# Patient Record
Sex: FEMALE | Race: BLACK OR AFRICAN AMERICAN | URBAN - METROPOLITAN AREA
[De-identification: names, ages, dates, MRNs, and addresses within clinical notes are randomized per-mention and may not be internally consistent; named-entity substitution may affect disease eponyms.]

---

## 2017-03-17 ENCOUNTER — OP HISTORICAL/CONVERTED ENCOUNTER (OUTPATIENT)
Dept: OTHER | Age: 62
End: 2017-03-17

## 2018-02-16 ENCOUNTER — OP HISTORICAL/CONVERTED ENCOUNTER (OUTPATIENT)
Dept: OTHER | Age: 63
End: 2018-02-16

## 2018-05-10 ENCOUNTER — OP HISTORICAL/CONVERTED ENCOUNTER (OUTPATIENT)
Dept: OTHER | Age: 63
End: 2018-05-10

## 2018-06-12 ENCOUNTER — OP HISTORICAL/CONVERTED ENCOUNTER (OUTPATIENT)
Dept: OTHER | Age: 63
End: 2018-06-12

## 2021-11-17 ENCOUNTER — HOSPITAL ENCOUNTER (INPATIENT)
Age: 66
LOS: 4 days | Discharge: NURSING, REHAB OR LONG-TERM CARE | DRG: 393 | End: 2021-11-21
Attending: EMERGENCY MEDICINE | Admitting: INTERNAL MEDICINE
Payer: MEDICARE

## 2021-11-17 ENCOUNTER — APPOINTMENT (OUTPATIENT)
Dept: ULTRASOUND IMAGING | Age: 66
DRG: 393 | End: 2021-11-17
Attending: OBSTETRICS & GYNECOLOGY
Payer: MEDICARE

## 2021-11-17 ENCOUNTER — APPOINTMENT (OUTPATIENT)
Dept: CT IMAGING | Age: 66
DRG: 393 | End: 2021-11-17
Attending: INTERNAL MEDICINE
Payer: MEDICARE

## 2021-11-17 ENCOUNTER — APPOINTMENT (OUTPATIENT)
Dept: GENERAL RADIOLOGY | Age: 66
DRG: 393 | End: 2021-11-17
Attending: EMERGENCY MEDICINE
Payer: MEDICARE

## 2021-11-17 DIAGNOSIS — N93.9 VAGINAL BLEEDING: Primary | ICD-10-CM

## 2021-11-17 LAB
ALBUMIN SERPL-MCNC: 2.4 G/DL (ref 3.5–5)
ALBUMIN/GLOB SERPL: 0.5 {RATIO} (ref 1.1–2.2)
ALP SERPL-CCNC: 69 U/L (ref 45–117)
ALT SERPL-CCNC: 13 U/L (ref 12–78)
ANION GAP SERPL CALC-SCNC: 1 MMOL/L (ref 5–15)
AST SERPL W P-5'-P-CCNC: 27 U/L (ref 15–37)
ATRIAL RATE: 75 BPM
BASOPHILS # BLD: 0.1 K/UL (ref 0–0.1)
BASOPHILS # BLD: 0.1 K/UL (ref 0–0.1)
BASOPHILS NFR BLD: 1 % (ref 0–1)
BASOPHILS NFR BLD: 1 % (ref 0–1)
BILIRUB SERPL-MCNC: 0.4 MG/DL (ref 0.2–1)
BUN SERPL-MCNC: 16 MG/DL (ref 6–20)
BUN/CREAT SERPL: 19 (ref 12–20)
CA-I BLD-MCNC: 8.7 MG/DL (ref 8.5–10.1)
CALCULATED P AXIS, ECG09: 42 DEGREES
CALCULATED R AXIS, ECG10: 17 DEGREES
CALCULATED T AXIS, ECG11: 4 DEGREES
CHLORIDE SERPL-SCNC: 113 MMOL/L (ref 97–108)
CO2 SERPL-SCNC: 28 MMOL/L (ref 21–32)
CREAT SERPL-MCNC: 0.86 MG/DL (ref 0.55–1.02)
DIAGNOSIS, 93000: NORMAL
DIFFERENTIAL METHOD BLD: ABNORMAL
DIFFERENTIAL METHOD BLD: ABNORMAL
EOSINOPHIL # BLD: 0.2 K/UL (ref 0–0.4)
EOSINOPHIL # BLD: 0.2 K/UL (ref 0–0.4)
EOSINOPHIL NFR BLD: 2 % (ref 0–7)
EOSINOPHIL NFR BLD: 2 % (ref 0–7)
ERYTHROCYTE [DISTWIDTH] IN BLOOD BY AUTOMATED COUNT: 17.3 % (ref 11.5–14.5)
ERYTHROCYTE [DISTWIDTH] IN BLOOD BY AUTOMATED COUNT: 17.3 % (ref 11.5–14.5)
GLOBULIN SER CALC-MCNC: 4.4 G/DL (ref 2–4)
GLUCOSE SERPL-MCNC: 94 MG/DL (ref 65–100)
HCT VFR BLD AUTO: 34 % (ref 35–47)
HCT VFR BLD AUTO: 34.8 % (ref 35–47)
HCT VFR BLD AUTO: 36.8 % (ref 35–47)
HGB BLD-MCNC: 10.3 G/DL (ref 11.5–16)
HGB BLD-MCNC: 10.5 G/DL (ref 11.5–16)
HGB BLD-MCNC: 11.4 G/DL (ref 11.5–16)
HGB BLD-MCNC: 9.3 G/DL (ref 11.5–16)
IMM GRANULOCYTES # BLD AUTO: 0 K/UL (ref 0–0.04)
IMM GRANULOCYTES # BLD AUTO: 0 K/UL (ref 0–0.04)
IMM GRANULOCYTES NFR BLD AUTO: 0 % (ref 0–0.5)
IMM GRANULOCYTES NFR BLD AUTO: 0 % (ref 0–0.5)
INR PPP: 1 (ref 0.9–1.1)
LYMPHOCYTES # BLD: 2.9 K/UL (ref 0.8–3.5)
LYMPHOCYTES # BLD: 3 K/UL (ref 0.8–3.5)
LYMPHOCYTES NFR BLD: 33 % (ref 12–49)
LYMPHOCYTES NFR BLD: 33 % (ref 12–49)
MCH RBC QN AUTO: 25.5 PG (ref 26–34)
MCH RBC QN AUTO: 25.6 PG (ref 26–34)
MCHC RBC AUTO-ENTMCNC: 30.2 G/DL (ref 30–36.5)
MCHC RBC AUTO-ENTMCNC: 30.3 G/DL (ref 30–36.5)
MCV RBC AUTO: 84.4 FL (ref 80–99)
MCV RBC AUTO: 84.7 FL (ref 80–99)
MONOCYTES # BLD: 0.8 K/UL (ref 0–1)
MONOCYTES # BLD: 0.8 K/UL (ref 0–1)
MONOCYTES NFR BLD: 9 % (ref 5–13)
MONOCYTES NFR BLD: 9 % (ref 5–13)
NEUTS SEG # BLD: 4.9 K/UL (ref 1.8–8)
NEUTS SEG # BLD: 4.9 K/UL (ref 1.8–8)
NEUTS SEG NFR BLD: 55 % (ref 32–75)
NEUTS SEG NFR BLD: 55 % (ref 32–75)
NRBC # BLD: 0 K/UL (ref 0–0.01)
NRBC # BLD: 0 K/UL (ref 0–0.01)
NRBC BLD-RTO: 0 PER 100 WBC
NRBC BLD-RTO: 0 PER 100 WBC
P-R INTERVAL, ECG05: 140 MS
PLATELET # BLD AUTO: 288 K/UL (ref 150–400)
PLATELET # BLD AUTO: 318 K/UL (ref 150–400)
PMV BLD AUTO: 10.5 FL (ref 8.9–12.9)
POTASSIUM SERPL-SCNC: 4.2 MMOL/L (ref 3.5–5.1)
POTASSIUM SERPL-SCNC: 6.3 MMOL/L (ref 3.5–5.1)
PROT SERPL-MCNC: 6.8 G/DL (ref 6.4–8.2)
PROTHROMBIN TIME: 13.2 SEC (ref 11.9–14.7)
Q-T INTERVAL, ECG07: 376 MS
QRS DURATION, ECG06: 84 MS
QTC CALCULATION (BEZET), ECG08: 419 MS
RBC # BLD AUTO: 4.03 M/UL (ref 3.8–5.2)
RBC # BLD AUTO: 4.11 M/UL (ref 3.8–5.2)
SODIUM SERPL-SCNC: 142 MMOL/L (ref 136–145)
TROPONIN-HIGH SENSITIVITY: 7 NG/L (ref 0–51)
VENTRICULAR RATE, ECG03: 75 BPM
WBC # BLD AUTO: 8.8 K/UL (ref 3.6–11)
WBC # BLD AUTO: 8.9 K/UL (ref 3.6–11)

## 2021-11-17 PROCEDURE — 94761 N-INVAS EAR/PLS OXIMETRY MLT: CPT

## 2021-11-17 PROCEDURE — 85025 COMPLETE CBC W/AUTO DIFF WBC: CPT

## 2021-11-17 PROCEDURE — 99285 EMERGENCY DEPT VISIT HI MDM: CPT

## 2021-11-17 PROCEDURE — 65270000029 HC RM PRIVATE

## 2021-11-17 PROCEDURE — 85018 HEMOGLOBIN: CPT

## 2021-11-17 PROCEDURE — 76856 US EXAM PELVIC COMPLETE: CPT

## 2021-11-17 PROCEDURE — 96374 THER/PROPH/DIAG INJ IV PUSH: CPT

## 2021-11-17 PROCEDURE — 74174 CTA ABD&PLVS W/CONTRAST: CPT

## 2021-11-17 PROCEDURE — 85610 PROTHROMBIN TIME: CPT

## 2021-11-17 PROCEDURE — 93005 ELECTROCARDIOGRAM TRACING: CPT

## 2021-11-17 PROCEDURE — 86901 BLOOD TYPING SEROLOGIC RH(D): CPT

## 2021-11-17 PROCEDURE — 84484 ASSAY OF TROPONIN QUANT: CPT

## 2021-11-17 PROCEDURE — 86923 COMPATIBILITY TEST ELECTRIC: CPT

## 2021-11-17 PROCEDURE — 65610000006 HC RM INTENSIVE CARE

## 2021-11-17 PROCEDURE — 84132 ASSAY OF SERUM POTASSIUM: CPT

## 2021-11-17 PROCEDURE — 74011250636 HC RX REV CODE- 250/636: Performed by: EMERGENCY MEDICINE

## 2021-11-17 PROCEDURE — P9016 RBC LEUKOCYTES REDUCED: HCPCS

## 2021-11-17 PROCEDURE — 71045 X-RAY EXAM CHEST 1 VIEW: CPT

## 2021-11-17 PROCEDURE — 85014 HEMATOCRIT: CPT

## 2021-11-17 PROCEDURE — 74011000636 HC RX REV CODE- 636: Performed by: INTERNAL MEDICINE

## 2021-11-17 PROCEDURE — 30233N1 TRANSFUSION OF NONAUTOLOGOUS RED BLOOD CELLS INTO PERIPHERAL VEIN, PERCUTANEOUS APPROACH: ICD-10-PCS | Performed by: INTERNAL MEDICINE

## 2021-11-17 PROCEDURE — 36415 COLL VENOUS BLD VENIPUNCTURE: CPT

## 2021-11-17 RX ORDER — POLYETHYLENE GLYCOL 3350 17 G/17G
17 POWDER, FOR SOLUTION ORAL DAILY PRN
Status: DISCONTINUED | OUTPATIENT
Start: 2021-11-17 | End: 2021-11-22 | Stop reason: HOSPADM

## 2021-11-17 RX ORDER — SODIUM CHLORIDE 0.9 % (FLUSH) 0.9 %
5-40 SYRINGE (ML) INJECTION EVERY 8 HOURS
Status: DISCONTINUED | OUTPATIENT
Start: 2021-11-17 | End: 2021-11-19

## 2021-11-17 RX ORDER — ACETAMINOPHEN 650 MG/1
650 SUPPOSITORY RECTAL
Status: DISCONTINUED | OUTPATIENT
Start: 2021-11-17 | End: 2021-11-22 | Stop reason: HOSPADM

## 2021-11-17 RX ORDER — FERROUS SULFATE 220 (44)/5
220 SOLUTION, ORAL ORAL 2 TIMES DAILY
COMMUNITY

## 2021-11-17 RX ORDER — PRASUGREL 10 MG/1
10 TABLET, FILM COATED ORAL
COMMUNITY
End: 2021-11-21

## 2021-11-17 RX ORDER — HYDROCODONE BITARTRATE AND ACETAMINOPHEN 5; 325 MG/1; MG/1
1 TABLET ORAL
COMMUNITY
End: 2021-11-21

## 2021-11-17 RX ORDER — POTASSIUM CHLORIDE 750 MG/1
20 TABLET, EXTENDED RELEASE ORAL DAILY
COMMUNITY

## 2021-11-17 RX ORDER — ONDANSETRON 2 MG/ML
4 INJECTION INTRAMUSCULAR; INTRAVENOUS
Status: DISCONTINUED | OUTPATIENT
Start: 2021-11-17 | End: 2021-11-22 | Stop reason: HOSPADM

## 2021-11-17 RX ORDER — SIMETHICONE 125 MG
125 CAPSULE ORAL
COMMUNITY

## 2021-11-17 RX ORDER — ONDANSETRON 4 MG/1
4 TABLET, ORALLY DISINTEGRATING ORAL
Status: DISCONTINUED | OUTPATIENT
Start: 2021-11-17 | End: 2021-11-22 | Stop reason: HOSPADM

## 2021-11-17 RX ORDER — MEMANTINE HYDROCHLORIDE 10 MG/1
10 TABLET ORAL DAILY
COMMUNITY

## 2021-11-17 RX ORDER — ASPIRIN 81 MG/1
81 TABLET ORAL DAILY
COMMUNITY
End: 2021-11-21

## 2021-11-17 RX ORDER — ADHESIVE BANDAGE
30 BANDAGE TOPICAL DAILY PRN
COMMUNITY

## 2021-11-17 RX ORDER — SODIUM CHLORIDE 0.9 % (FLUSH) 0.9 %
5-40 SYRINGE (ML) INJECTION AS NEEDED
Status: DISCONTINUED | OUTPATIENT
Start: 2021-11-17 | End: 2021-11-22 | Stop reason: HOSPADM

## 2021-11-17 RX ORDER — DEXTROSE 50 % IN WATER (D50W) INTRAVENOUS SYRINGE
25 ONCE
Status: DISCONTINUED | OUTPATIENT
Start: 2021-11-17 | End: 2021-11-17

## 2021-11-17 RX ORDER — DIVALPROEX SODIUM 125 MG/1
125 CAPSULE, COATED PELLETS ORAL 2 TIMES DAILY
COMMUNITY

## 2021-11-17 RX ORDER — ACETAMINOPHEN 325 MG/1
650 TABLET ORAL
Status: DISCONTINUED | OUTPATIENT
Start: 2021-11-17 | End: 2021-11-22 | Stop reason: HOSPADM

## 2021-11-17 RX ORDER — DONEPEZIL HYDROCHLORIDE 10 MG/1
10 TABLET, FILM COATED ORAL
COMMUNITY

## 2021-11-17 RX ORDER — FOLIC ACID 1 MG/1
1 TABLET ORAL DAILY
COMMUNITY

## 2021-11-17 RX ORDER — TRAZODONE HYDROCHLORIDE 50 MG/1
50 TABLET ORAL
COMMUNITY

## 2021-11-17 RX ORDER — AMLODIPINE BESYLATE 2.5 MG/1
2.5 TABLET ORAL DAILY
COMMUNITY
End: 2021-11-21

## 2021-11-17 RX ORDER — GLYCOPYRROLATE 1 MG/1
1 TABLET ORAL 2 TIMES DAILY
COMMUNITY

## 2021-11-17 RX ORDER — GABAPENTIN 100 MG/1
100 CAPSULE ORAL 3 TIMES DAILY
COMMUNITY

## 2021-11-17 RX ORDER — SIMVASTATIN 20 MG/1
20 TABLET, FILM COATED ORAL
COMMUNITY

## 2021-11-17 RX ORDER — FUROSEMIDE 10 MG/ML
20 INJECTION INTRAMUSCULAR; INTRAVENOUS
Status: COMPLETED | OUTPATIENT
Start: 2021-11-17 | End: 2021-11-17

## 2021-11-17 RX ORDER — SODIUM CHLORIDE 9 MG/ML
150 INJECTION, SOLUTION INTRAVENOUS CONTINUOUS
Status: DISCONTINUED | OUTPATIENT
Start: 2021-11-17 | End: 2021-11-18

## 2021-11-17 RX ADMIN — SODIUM CHLORIDE 1000 ML: 9 INJECTION, SOLUTION INTRAVENOUS at 16:08

## 2021-11-17 RX ADMIN — IOPAMIDOL 100 ML: 755 INJECTION, SOLUTION INTRAVENOUS at 23:06

## 2021-11-17 RX ADMIN — FUROSEMIDE 20 MG: 10 INJECTION, SOLUTION INTRAMUSCULAR; INTRAVENOUS at 16:07

## 2021-11-17 RX ADMIN — Medication 10 ML: at 16:08

## 2021-11-17 NOTE — H&P
GENERAL GENERIC H&P/CONSULT    Subjective:    66F with a past medical history significant for CVA w/R-sided deficits and aphasia, on anticoagulation with prasugrel. Nonverbal and resides in a nursing home. 11/17/21 presents to hospital from [address] [place] [address] with what was initially thought to be rectal bleeding. During the ED course, determined to be vaginal bleeding. Stable hemoglobin during the ED course and plan to admit to hospital for stabilization. Later became more hypotensive, managed with fluid boluses, and more profusely bleeding and status changed to ICU. Past Medical History:   Diagnosis Date    Aphasia as late effect of cerebrovascular accident     Cerebral aneurysm     Coronary artery disease     [facility]     Dementia ([facility]     Epilepsy ([facility] [facility]     GERD (gastroesophageal reflux disease)     Hyperlipidemia     Hypertension     Ischemic optic neuropathy of left eye     Right hemiparesis ([facility] [address] [facility]       History reviewed. No pertinent surgical history. Prior to Admission medications    Medication Sig Start Date End Date Taking? Authorizing Provider   amLODIPine (NORVASC) 2.5 mg tablet Take 2.5 mg by mouth daily. Yes Provider, Historical   aspirin delayed-release 81 mg tablet Take 81 mg by mouth daily. Yes Provider, Historical   divalproex (Depakote Sprinkles) 125 mg capsule Take 125 mg by mouth two (2) times a day. Yes Provider, Historical   donepeziL (ARICEPT) 10 mg tablet Take 10 mg by mouth nightly. Yes Provider, Historical   prasugreL (Effient) 10 mg tablet Take 10 mg by mouth. Yes Provider, Historical   ferrous sulfate 220 mg (44 mg iron)/5 mL solution Take 220 mg by mouth two (2) times a day. Yes Provider, Historical   folic acid (FOLVITE) 1 mg tablet Take 1 mg by mouth daily. Yes Provider, Historical   gabapentin (NEURONTIN) 100 mg capsule Take 100 mg by mouth three (3) times daily.    Yes Provider, Historical   glycopyrrolate (ROBINUL) 1 mg tablet Take 1 mg by mouth two (2) times a day. Yes Provider, Historical   memantine (NAMENDA) 10 mg tablet Take 10 mg by mouth daily. Yes Provider, Historical   magnesium hydroxide (Peña Milk of Magnesia) 400 mg/5 mL suspension Take 30 mL by mouth daily as needed for Constipation. Yes Provider, Historical   HYDROcodone-acetaminophen (Norco) 5-325 mg per tablet Take 1 Tablet by mouth every six (6) hours as needed for Pain. Yes Provider, Historical   benzocaine (ORAJEL) 20 % gel topical gel Apply  to affected area as needed for Pain. Yes Provider, Historical   potassium chloride (KLOR-CON M10) 10 mEq tablet Take 20 mEq by mouth daily. Yes Provider, Historical   simethicone (GAS-X) 125 mg capsule Take 125 mg by mouth four (4) times daily as needed for Flatulence. Yes Provider, Historical   traZODone (DESYREL) 50 mg tablet Take 50 mg by mouth nightly. Yes Provider, Historical   simvastatin (Zocor) 20 mg tablet Take 20 mg by mouth nightly. Yes Provider, Historical     No Known Allergies   Social History     Tobacco Use    Smoking status: Not on file    Smokeless tobacco: Not on file   Substance Use Topics    Alcohol use: Not on file      Family History   Family history unknown: Yes      Review of Systems   Unable to perform ROS: Acuity of condition       Objective:    No intake/output data recorded. No intake/output data recorded.   Patient Vitals for the past 8 hrs:   BP Temp Pulse Resp SpO2 Height Weight   11/17/21 1805 (!) 57/41  97 (!) 34      11/17/21 1752 92/78  (!) 111 23 97 %     11/17/21 1723     98 %     11/17/21 1721 (!) 80/58  (!) 112  99 %     11/17/21 1607 104/80  89  98 %     11/17/21 1528 133/75  96 22 98 %     11/17/21 1505 (!) 85/66  85 20 98 %     11/17/21 1434 109/74 98.4 °F (36.9 °C) 81 20 97 % 5' 6\" (1.676 m) 81.6 kg (180 lb)     Physical Exam   Constitutional: Awake and alert, interactive, NAD  Eyes: PERRL, no injection or scleral icterus, no discharge  HEENT: NCAT, neck supple, MMM, no oropharyngeal exudates  CV: RRR, no m/r/g  Respiratory: CTAB, no r/r/w  GI: Abd soft, nondistended, nontender  : No bleeding from rectum, gross blood from vagina  MSK: FROM, no joint effusions or edema  Skin: No rashes  Neuro: Symmetric facies, aphasic, R-sided hemiplegia    Labs:    Recent Results (from the past 24 hour(s))   CBC WITH AUTOMATED DIFF    Collection Time: 11/17/21  2:53 PM   Result Value Ref Range    WBC 8.8 3.6 - 11.0 K/uL    RBC 4.11 3.80 - 5.20 M/uL    HGB 10.5 (L) 11.5 - 16.0 g/dL    HCT 34.8 (L) 35.0 - 47.0 %    MCV 84.7 80.0 - 99.0 FL    MCH 25.5 (L) 26.0 - 34.0 PG    MCHC 30.2 30.0 - 36.5 g/dL    RDW 17.3 (H) 11.5 - 14.5 %    PLATELET 596 737 - 431 K/uL    MPV 10.5 8.9 - 12.9 FL    NRBC 0.0 0.0  WBC    ABSOLUTE NRBC 0.00 0.00 - 0.01 K/uL    NEUTROPHILS 55 32 - 75 %    LYMPHOCYTES 33 12 - 49 %    MONOCYTES 9 5 - 13 %    EOSINOPHILS 2 0 - 7 %    BASOPHILS 1 0 - 1 %    IMMATURE GRANULOCYTES 0 0 - 0.5 %    ABS. NEUTROPHILS 4.9 1.8 - 8.0 K/UL    ABS. LYMPHOCYTES 2.9 0.8 - 3.5 K/UL    ABS. MONOCYTES 0.8 0.0 - 1.0 K/UL    ABS. EOSINOPHILS 0.2 0.0 - 0.4 K/UL    ABS. BASOPHILS 0.1 0.0 - 0.1 K/UL    ABS. IMM. GRANS. 0.0 0.00 - 0.04 K/UL    DF AUTOMATED     METABOLIC PANEL, COMPREHENSIVE    Collection Time: 11/17/21  2:53 PM   Result Value Ref Range    Sodium 142 136 - 145 mmol/L    Potassium 6.3 (H) 3.5 - 5.1 mmol/L    Chloride 113 (H) 97 - 108 mmol/L    CO2 28 21 - 32 mmol/L    Anion gap 1 (L) 5 - 15 mmol/L    Glucose 94 65 - 100 mg/dL    BUN 16 6 - 20 mg/dL    Creatinine 0.86 0.55 - 1.02 mg/dL    BUN/Creatinine ratio 19 12 - 20      GFR est AA >60 >60 ml/min/1.73m2    GFR est non-AA >60 >60 ml/min/1.73m2    Calcium 8.7 8.5 - 10.1 mg/dL    Bilirubin, total 0.4 0.2 - 1.0 mg/dL    AST (SGOT) 27 15 - 37 U/L    ALT (SGPT) 13 12 - 78 U/L    Alk.  phosphatase 69 45 - 117 U/L    Protein, total 6.8 6.4 - 8.2 g/dL    Albumin 2.4 (L) 3.5 - 5.0 g/dL    Globulin 4.4 (H) 2.0 - 4.0 g/dL    A-G Ratio 0.5 (L) 1.1 - 2.2     TYPE & SCREEN    Collection Time: 11/17/21  2:53 PM   Result Value Ref Range    Crossmatch Expiration 11/20/2021,2359     ABO/Rh(D) Gwyn Corado Positive     Antibody screen Negative    TROPONIN-HIGH SENSITIVITY    Collection Time: 11/17/21  2:53 PM   Result Value Ref Range    Troponin-High Sensitivity 7 0 - 51 ng/L   PROTHROMBIN TIME + INR    Collection Time: 11/17/21  2:53 PM   Result Value Ref Range    Prothrombin time 13.2 11.9 - 14.7 sec    INR 1.0 0.9 - 1.1     CBC WITH AUTOMATED DIFF    Collection Time: 11/17/21  4:37 PM   Result Value Ref Range    WBC 8.9 3.6 - 11.0 K/uL    RBC 4.03 3.80 - 5.20 M/uL    HGB 10.3 (L) 11.5 - 16.0 g/dL    HCT 34.0 (L) 35.0 - 47.0 %    MCV 84.4 80.0 - 99.0 FL    MCH 25.6 (L) 26.0 - 34.0 PG    MCHC 30.3 30.0 - 36.5 g/dL    RDW 17.3 (H) 11.5 - 14.5 %    PLATELET 477 224 - 608 K/uL    NRBC 0.0 0.0  WBC    ABSOLUTE NRBC 0.00 0.00 - 0.01 K/uL    NEUTROPHILS 55 32 - 75 %    LYMPHOCYTES 33 12 - 49 %    MONOCYTES 9 5 - 13 %    EOSINOPHILS 2 0 - 7 %    BASOPHILS 1 0 - 1 %    IMMATURE GRANULOCYTES 0 0 - 0.5 %    ABS. NEUTROPHILS 4.9 1.8 - 8.0 K/UL    ABS. LYMPHOCYTES 3.0 0.8 - 3.5 K/UL    ABS. MONOCYTES 0.8 0.0 - 1.0 K/UL    ABS. EOSINOPHILS 0.2 0.0 - 0.4 K/UL    ABS. BASOPHILS 0.1 0.0 - 0.1 K/UL    ABS. IMM. GRANS. 0.0 0.00 - 0.04 K/UL    DF AUTOMATED     POTASSIUM    Collection Time: 11/17/21  4:37 PM   Result Value Ref Range    Potassium 4.2 3.5 - 5.1 mmol/L     Chest X-Ray: Chest view.     Comparison single view chest November 26, 2015.     Lungs clear; no interstitial or alveolar pulmonary edema. Cardiac and  mediastinal structures magnified on this AP view. Similar degree thoracic aorta  ectasia. No pneumothorax or sizable pleural effusion    Assessment:  Active Problems:    Vaginal bleeding (11/17/2021)    66F with a past medical history significant for CVA w/R-sided deficits and aphasia, on anticoagulation with prasugrel. Nonverbal and resides in a nursing home. 11/17/21 presents to hospital from [address] [place] [address] with what was initially thought to be rectal bleeding. During the ED course, determined to be vaginal bleeding. Stable hemoglobin during the ED course and plan to admit to hospital for stabilization. Later became more hypotensive, managed with fluid boluses, and more profusely bleeding and status changed to ICU. Plan:    1) Profuse vaginal bleeding in a patient on anticoagulation after stroke. Normal platelets, normal INR.      Admit to hospital and ICU   Supportive care   Intravenous fluid boluses   Hold all anticoagulants   PRBC if needed   Awaiting gynecology for stabilization of bleed    Signed:  Raf Hinson MD 11/17/2021

## 2021-11-17 NOTE — ED PROVIDER NOTES
EMERGENCY DEPARTMENT HISTORY AND PHYSICAL EXAM      Date: 11/17/2021  Patient Name: Rahel Pinon      History of Presenting Illness     Chief Complaint   Patient presents with    Anal Bleeding       History Provided By: Patient and EMS    HPI: Rahel Pinon, 74 y.o. female with a past medical history significant for CVA w/R-sided deficits and aphasia, Dementia presents to the ED with cc of \"anal bleeding. \" Pt unable to speak 2/2 stroke. Denies current sx of CP, SOB. Does endorse bleeding since yesterday and lightheadedness. States she has had vaginal bleeding before but cannot qualify further, unsure if she has had to go to hospital for same. There are no other complaints, changes, or physical findings at this time. PCP: No primary care provider on file. Past History     Past Medical History:  No past medical history on file. Past Surgical History:  No past surgical history on file. Family History:  No family history on file. Social History:  Social History     Tobacco Use    Smoking status: Not on file    Smokeless tobacco: Not on file   Substance Use Topics    Alcohol use: Not on file    Drug use: Not on file       Allergies:  No Known Allergies      Review of Systems   Constitutional: Negative except as in HPI. Eyes: Negative except as in HPI.  ENT: Negative except as in HPI. Cardiovascular: Negative except as in HPI. Respiratory: Negative except as in HPI. Gastrointestinal: Negative except as in HPI. Genitourinary: Negative except as in HPI. Musculoskeletal: Negative except as in HPI. Integumentary: Negative except as in HPI. Neurological: Negative except as in HPI. Psychiatric: Negative except as in HPI. Endocrine: Negative except as in HPI. Hematologic/Lymphatic: Negative except as in HPI. Allergic/Immunologic: Negative except as in HPI.     Physical Exam   Constitutional: Awake and alert, interactive, NAD  Eyes: PERRL, no injection or scleral icterus, no discharge  HEENT: NCAT, neck supple, MMM, no oropharyngeal exudates  CV: RRR, no m/r/g  Respiratory: CTAB, no r/r/w  GI: Abd soft, nondistended, nontender  : No bleeding from rectum, gross blood from vagina  MSK: FROM, no joint effusions or edema  Skin: No rashes  Neuro: Symmetric facies, aphasic, R-sided hemiplegia    Lab and Diagnostic Study Results     Labs -   No results found for this or any previous visit (from the past 12 hour(s)). Radiologic Studies -   [unfilled]  CT Results  (Last 48 hours)      None          CXR Results  (Last 48 hours)      None            Medical Decision Making and ED Course   - I am the first and primary provider for this patient AND AM THE PRIMARY PROVIDER OF RECORD. - I reviewed the vital signs, available nursing notes, past medical history, past surgical history, family history and social history. - Initial assessment performed. The patients presenting problems have been discussed, and the staff are in agreement with the care plan formulated and outlined with them. I have encouraged them to ask questions as they arise throughout their visit. Vital Signs-Reviewed the patient's vital signs. Patient Vitals for the past 12 hrs:   Temp Pulse Resp BP SpO2   11/17/21 1434 98.4 °F (36.9 °C) 81 20 109/74 97 %       EKG interpretation:         Provider Notes (Medical Decision Making):     25H w/vaginal bleeding. Abnormal uterine bleeding suspected, will get CBC, T&S and transfuse >7, consult GYN for recs, likely admit for treatment. ED Course:       ED Course as of 11/17/21 2122 Wed Nov 17, 2021   1549 Potassium(!): 6.3  No EKG changes. Giving IVF, lasix, Insulin/D50 [YA]   1612 Spoke to OBGYN on call Dr. Jules Whitfield, states no medication he would recommend at this time or intervention by OBGYN. Would recommend admission to medicine for observation. Will repeat CBC and K+ and admit. Spoke to Dr. Juancarlos Vann, hospitalist on call.  [YA]   1704 Potassium: 4.2  Improved, likely 2/2 hemolysis. Only received lasix and IVF. Hgb stable on recheck, but will admit for obs per GYN. [YA]   [address] Admitted to Dr. Hannah Aleman [YA]      ED Course User Index  [YA] Lisa Fagan MD         Consultations:     OBGYN Dr. Zayda Bagley      Disposition     Disposition: Admitted to Floor Medical Floor the case was discussed with the admitting physician Dr. Serena Bagley    Admitted      Diagnosis     Clinical Impression:   1. Vaginal bleeding        Attestations:     Lester Bacon MD

## 2021-11-17 NOTE — ED TRIAGE NOTES
Per ems called for rectal bleeding, pt does not speak due to previous cva but a&ox4. Per pt rectal bleeding started yesterday, denies any pain.

## 2021-11-17 NOTE — Clinical Note
Status[de-identified] INPATIENT [101]   Type of Bed: Remote Telemetry [29]   Cardiac Monitoring Required?: Yes   Inpatient Hospitalization Certified Necessary for the Following Reasons: 3.  Patient receiving treatment that can only be provided in an inpatient setting (further clarification in H&P documentation)   Admitting Diagnosis: Vaginal bleeding [[id number]]   Admitting Physician: Romero Menjivar [[id number]]   Attending Physician: Romero Menjivar [[id number]]   Estimated Length of Stay: 3-4 Midnights   Discharge Plan[de-identified] Extended Care Facility (e.g. Adult Home, Nursing Home, etc.)

## 2021-11-17 NOTE — PROGRESS NOTES
11/17/21. PCP is MELA Bardales - seen q 30 days @ [facility]. Message left for guardian Grazyna Metzger)  @ [phone] ( after hours #) via [place] regarding Choice Letter - message . Pt from [facility] .  Alert but does not speak,

## 2021-11-17 NOTE — PROGRESS NOTES
Reason for Admission:  Vaginal Bleeding                     RUR Score:   N/A                  Plan for utilizing home health:None. From [facility]. PCP: First and Last name:  Jenny Desai     Name of Practice:    Are you a current patient: Yes/No: Yes   Approximate date of last visit:Seen q 30 days @ SNF. Can you participate in a virtual visit with your PCP: No. Pt does not speak. Current Advanced Directive/Advance Care Plan: Full Code      Healthcare Decision Maker:   Nicolle Winston @ 048-034. After hours call: [phone]- [place] [facility]. Transition of Care Plan:  Awaiting contact for guardian for Choice Letter to refer pt back to [facility]  upon discharge. Pt will need transportation.

## 2021-11-18 ENCOUNTER — ANESTHESIA (OUTPATIENT)
Dept: ENDOSCOPY | Age: 66
DRG: 393 | End: 2021-11-18
Payer: MEDICARE

## 2021-11-18 ENCOUNTER — APPOINTMENT (OUTPATIENT)
Dept: ENDOSCOPY | Age: 66
DRG: 393 | End: 2021-11-18
Attending: INTERNAL MEDICINE
Payer: MEDICARE

## 2021-11-18 ENCOUNTER — ANESTHESIA EVENT (OUTPATIENT)
Dept: ENDOSCOPY | Age: 66
DRG: 393 | End: 2021-11-18
Payer: MEDICARE

## 2021-11-18 LAB
ABO + RH BLD: NORMAL
ALBUMIN SERPL-MCNC: 2.3 G/DL (ref 3.5–5)
ALBUMIN/GLOB SERPL: 0.7 {RATIO} (ref 1.1–2.2)
ALP SERPL-CCNC: 59 U/L (ref 45–117)
ALT SERPL-CCNC: 7 U/L (ref 12–78)
ANION GAP SERPL CALC-SCNC: 4 MMOL/L (ref 5–15)
APTT PPP: 29.2 SEC (ref 21.2–34.1)
AST SERPL W P-5'-P-CCNC: 11 U/L (ref 15–37)
BASOPHILS # BLD: 0 K/UL (ref 0–0.1)
BASOPHILS # BLD: 0.1 K/UL (ref 0–0.1)
BASOPHILS # BLD: 0.1 K/UL (ref 0–0.1)
BASOPHILS NFR BLD: 0 % (ref 0–1)
BILIRUB SERPL-MCNC: 0.2 MG/DL (ref 0.2–1)
BLOOD GROUP ANTIBODIES SERPL: NEGATIVE
BUN SERPL-MCNC: 11 MG/DL (ref 6–20)
BUN/CREAT SERPL: 18 (ref 12–20)
CA-I BLD-MCNC: 7.2 MG/DL (ref 8.5–10.1)
CHLORIDE SERPL-SCNC: 115 MMOL/L (ref 97–108)
CO2 SERPL-SCNC: 26 MMOL/L (ref 21–32)
CREAT SERPL-MCNC: 0.62 MG/DL (ref 0.55–1.02)
DIFFERENTIAL METHOD BLD: ABNORMAL
EOSINOPHIL # BLD: 0.1 K/UL (ref 0–0.4)
EOSINOPHIL NFR BLD: 1 % (ref 0–7)
ERYTHROCYTE [DISTWIDTH] IN BLOOD BY AUTOMATED COUNT: 17 % (ref 11.5–14.5)
GLOBULIN SER CALC-MCNC: 3.4 G/DL (ref 2–4)
GLUCOSE BLD STRIP.AUTO-MCNC: 94 MG/DL (ref 65–117)
GLUCOSE BLD STRIP.AUTO-MCNC: 97 MG/DL (ref 65–117)
GLUCOSE SERPL-MCNC: 115 MG/DL (ref 65–100)
HCT VFR BLD AUTO: 25.3 % (ref 35–47)
HCT VFR BLD AUTO: 30.2 % (ref 35–47)
HCT VFR BLD AUTO: 35.2 % (ref 35–47)
HGB BLD-MCNC: 10.9 G/DL (ref 11.5–16)
HGB BLD-MCNC: 7.9 G/DL (ref 11.5–16)
HGB BLD-MCNC: 9.5 G/DL (ref 11.5–16)
IMM GRANULOCYTES # BLD AUTO: 0 K/UL (ref 0–0.04)
IMM GRANULOCYTES # BLD AUTO: 0 K/UL (ref 0–0.04)
IMM GRANULOCYTES # BLD AUTO: 0.1 K/UL (ref 0–0.04)
IMM GRANULOCYTES NFR BLD AUTO: 0 % (ref 0–0.5)
INR PPP: 1.1 (ref 0.9–1.1)
LACTATE SERPL-SCNC: 1.2 MMOL/L (ref 0.4–2)
LYMPHOCYTES # BLD: 1.9 K/UL (ref 0.8–3.5)
LYMPHOCYTES # BLD: 2.9 K/UL (ref 0.8–3.5)
LYMPHOCYTES # BLD: 3.4 K/UL (ref 0.8–3.5)
LYMPHOCYTES NFR BLD: 20 % (ref 12–49)
LYMPHOCYTES NFR BLD: 21 % (ref 12–49)
LYMPHOCYTES NFR BLD: 25 % (ref 12–49)
MAGNESIUM SERPL-MCNC: 1.8 MG/DL (ref 1.6–2.4)
MCH RBC QN AUTO: 26.5 PG (ref 26–34)
MCH RBC QN AUTO: 27.1 PG (ref 26–34)
MCH RBC QN AUTO: 27.1 PG (ref 26–34)
MCHC RBC AUTO-ENTMCNC: 31 G/DL (ref 30–36.5)
MCHC RBC AUTO-ENTMCNC: 31.2 G/DL (ref 30–36.5)
MCHC RBC AUTO-ENTMCNC: 31.5 G/DL (ref 30–36.5)
MCV RBC AUTO: 85.6 FL (ref 80–99)
MCV RBC AUTO: 86 FL (ref 80–99)
MCV RBC AUTO: 86.6 FL (ref 80–99)
MONOCYTES # BLD: 0.8 K/UL (ref 0–1)
MONOCYTES # BLD: 1 K/UL (ref 0–1)
MONOCYTES # BLD: 1.4 K/UL (ref 0–1)
MONOCYTES NFR BLD: 8 % (ref 5–13)
MONOCYTES NFR BLD: 8 % (ref 5–13)
MONOCYTES NFR BLD: 9 % (ref 5–13)
MRSA DNA SPEC QL NAA+PROBE: NOT DETECTED
NEUTS SEG # BLD: 11.9 K/UL (ref 1.8–8)
NEUTS SEG # BLD: 6.3 K/UL (ref 1.8–8)
NEUTS SEG # BLD: 7.5 K/UL (ref 1.8–8)
NEUTS SEG NFR BLD: 65 % (ref 32–75)
NEUTS SEG NFR BLD: 70 % (ref 32–75)
NEUTS SEG NFR BLD: 71 % (ref 32–75)
NRBC # BLD: 0 K/UL (ref 0–0.01)
NRBC BLD-RTO: 0 PER 100 WBC
PERFORMED BY, TECHID: NORMAL
PERFORMED BY, TECHID: NORMAL
PLATELET # BLD AUTO: 195 K/UL (ref 150–400)
PLATELET # BLD AUTO: 213 K/UL (ref 150–400)
PLATELET # BLD AUTO: 230 K/UL (ref 150–400)
PMV BLD AUTO: 10.5 FL (ref 8.9–12.9)
PMV BLD AUTO: 10.7 FL (ref 8.9–12.9)
POTASSIUM SERPL-SCNC: 3.7 MMOL/L (ref 3.5–5.1)
POTASSIUM SERPL-SCNC: 3.9 MMOL/L (ref 3.5–5.1)
PROT SERPL-MCNC: 5.7 G/DL (ref 6.4–8.2)
PROTHROMBIN TIME: 13.6 SEC (ref 11.9–14.7)
RBC # BLD AUTO: 2.92 M/UL (ref 3.8–5.2)
RBC # BLD AUTO: 3.51 M/UL (ref 3.8–5.2)
RBC # BLD AUTO: 4.11 M/UL (ref 3.8–5.2)
SODIUM SERPL-SCNC: 145 MMOL/L (ref 136–145)
SPECIMEN EXP DATE BLD: NORMAL
THERAPEUTIC RANGE,PTTT: NORMAL SEC (ref 82–109)
WBC # BLD AUTO: 11.6 K/UL (ref 3.6–11)
WBC # BLD AUTO: 16.9 K/UL (ref 3.6–11)
WBC # BLD AUTO: 9.2 K/UL (ref 3.6–11)

## 2021-11-18 PROCEDURE — P9016 RBC LEUKOCYTES REDUCED: HCPCS

## 2021-11-18 PROCEDURE — 85610 PROTHROMBIN TIME: CPT

## 2021-11-18 PROCEDURE — 74011250636 HC RX REV CODE- 250/636: Performed by: INTERNAL MEDICINE

## 2021-11-18 PROCEDURE — 77030041709 HC NDL SCLER BSC -C: Performed by: INTERNAL MEDICINE

## 2021-11-18 PROCEDURE — P9047 ALBUMIN (HUMAN), 25%, 50ML: HCPCS | Performed by: INTERNAL MEDICINE

## 2021-11-18 PROCEDURE — 74011250636 HC RX REV CODE- 250/636: Performed by: NURSE ANESTHETIST, CERTIFIED REGISTERED

## 2021-11-18 PROCEDURE — 85025 COMPLETE CBC W/AUTO DIFF WBC: CPT

## 2021-11-18 PROCEDURE — 36415 COLL VENOUS BLD VENIPUNCTURE: CPT

## 2021-11-18 PROCEDURE — 82962 GLUCOSE BLOOD TEST: CPT

## 2021-11-18 PROCEDURE — 76060000035 HC ANESTHESIA 2 TO 2.5 HR: Performed by: INTERNAL MEDICINE

## 2021-11-18 PROCEDURE — 0W3P8ZZ CONTROL BLEEDING IN GASTROINTESTINAL TRACT, VIA NATURAL OR ARTIFICIAL OPENING ENDOSCOPIC: ICD-10-PCS | Performed by: INTERNAL MEDICINE

## 2021-11-18 PROCEDURE — 77030010936 HC CLP LIG BSC -C: Performed by: INTERNAL MEDICINE

## 2021-11-18 PROCEDURE — 76040000003: Performed by: INTERNAL MEDICINE

## 2021-11-18 PROCEDURE — 83735 ASSAY OF MAGNESIUM: CPT

## 2021-11-18 PROCEDURE — 36430 TRANSFUSION BLD/BLD COMPNT: CPT

## 2021-11-18 PROCEDURE — 87641 MR-STAPH DNA AMP PROBE: CPT

## 2021-11-18 PROCEDURE — 74011000250 HC RX REV CODE- 250: Performed by: INTERNAL MEDICINE

## 2021-11-18 PROCEDURE — 74011000258 HC RX REV CODE- 258: Performed by: INTERNAL MEDICINE

## 2021-11-18 PROCEDURE — 74011000250 HC RX REV CODE- 250: Performed by: NURSE ANESTHETIST, CERTIFIED REGISTERED

## 2021-11-18 PROCEDURE — 84132 ASSAY OF SERUM POTASSIUM: CPT

## 2021-11-18 PROCEDURE — 85730 THROMBOPLASTIN TIME PARTIAL: CPT

## 2021-11-18 PROCEDURE — 65610000006 HC RM INTENSIVE CARE

## 2021-11-18 PROCEDURE — 77030009426 HC FCPS BIOP ENDOSC BSC -B: Performed by: INTERNAL MEDICINE

## 2021-11-18 PROCEDURE — 83605 ASSAY OF LACTIC ACID: CPT

## 2021-11-18 RX ORDER — PROPOFOL 10 MG/ML
INJECTION, EMULSION INTRAVENOUS AS NEEDED
Status: DISCONTINUED | OUTPATIENT
Start: 2021-11-18 | End: 2021-11-18 | Stop reason: HOSPADM

## 2021-11-18 RX ORDER — NOREPINEPHRINE BITARTRATE/D5W 8 MG/250ML
.5-16 PLASTIC BAG, INJECTION (ML) INTRAVENOUS
Status: DISCONTINUED | OUTPATIENT
Start: 2021-11-18 | End: 2021-11-20

## 2021-11-18 RX ORDER — LIDOCAINE HYDROCHLORIDE 20 MG/ML
INJECTION, SOLUTION EPIDURAL; INFILTRATION; INTRACAUDAL; PERINEURAL AS NEEDED
Status: DISCONTINUED | OUTPATIENT
Start: 2021-11-18 | End: 2021-11-18 | Stop reason: HOSPADM

## 2021-11-18 RX ORDER — FOLIC ACID 1 MG/1
1 TABLET ORAL DAILY
Status: DISCONTINUED | OUTPATIENT
Start: 2021-11-18 | End: 2021-11-22 | Stop reason: HOSPADM

## 2021-11-18 RX ORDER — ALBUMIN HUMAN 250 G/1000ML
25 SOLUTION INTRAVENOUS ONCE
Status: COMPLETED | OUTPATIENT
Start: 2021-11-18 | End: 2021-11-18

## 2021-11-18 RX ORDER — SODIUM CHLORIDE 450 MG/100ML
75 INJECTION, SOLUTION INTRAVENOUS CONTINUOUS
Status: DISCONTINUED | OUTPATIENT
Start: 2021-11-18 | End: 2021-11-22 | Stop reason: HOSPADM

## 2021-11-18 RX ORDER — SIMVASTATIN 10 MG/1
20 TABLET, FILM COATED ORAL
Status: DISCONTINUED | OUTPATIENT
Start: 2021-11-18 | End: 2021-11-22 | Stop reason: HOSPADM

## 2021-11-18 RX ORDER — DONEPEZIL HYDROCHLORIDE 5 MG/1
10 TABLET, FILM COATED ORAL
Status: DISCONTINUED | OUTPATIENT
Start: 2021-11-18 | End: 2021-11-22 | Stop reason: HOSPADM

## 2021-11-18 RX ORDER — NOREPINEPHRINE BITARTRATE 1 MG/ML
INJECTION, SOLUTION INTRAVENOUS
Status: DISPENSED
Start: 2021-11-18 | End: 2021-11-19

## 2021-11-18 RX ORDER — SODIUM CHLORIDE 9 MG/ML
250 INJECTION, SOLUTION INTRAVENOUS AS NEEDED
Status: DISCONTINUED | OUTPATIENT
Start: 2021-11-18 | End: 2021-11-22 | Stop reason: HOSPADM

## 2021-11-18 RX ORDER — PHENYLEPHRINE HYDROCHLORIDE 10 MG/ML
INJECTION INTRAVENOUS
Status: DISPENSED
Start: 2021-11-18 | End: 2021-11-19

## 2021-11-18 RX ORDER — MEMANTINE HYDROCHLORIDE 10 MG/1
10 TABLET ORAL DAILY
Status: DISCONTINUED | OUTPATIENT
Start: 2021-11-18 | End: 2021-11-22 | Stop reason: HOSPADM

## 2021-11-18 RX ORDER — TRAZODONE HYDROCHLORIDE 50 MG/1
50 TABLET ORAL
Status: DISCONTINUED | OUTPATIENT
Start: 2021-11-18 | End: 2021-11-22 | Stop reason: HOSPADM

## 2021-11-18 RX ORDER — DIVALPROEX SODIUM 125 MG/1
125 CAPSULE, COATED PELLETS ORAL 2 TIMES DAILY
Status: DISCONTINUED | OUTPATIENT
Start: 2021-11-18 | End: 2021-11-20

## 2021-11-18 RX ADMIN — SODIUM CHLORIDE 60 MG: 9 INJECTION, SOLUTION INTRAVENOUS at 15:10

## 2021-11-18 RX ADMIN — SODIUM CHLORIDE 75 ML/HR: 4.5 INJECTION, SOLUTION INTRAVENOUS at 09:36

## 2021-11-18 RX ADMIN — PROPOFOL 60 MG: 10 INJECTION, EMULSION INTRAVENOUS at 13:26

## 2021-11-18 RX ADMIN — ALBUMIN (HUMAN) 25 G: 0.25 INJECTION, SOLUTION INTRAVENOUS at 15:13

## 2021-11-18 RX ADMIN — Medication 10 ML: at 14:00

## 2021-11-18 RX ADMIN — Medication 10 ML: at 06:00

## 2021-11-18 RX ADMIN — SODIUM CHLORIDE 150 ML/HR: 9 INJECTION, SOLUTION INTRAVENOUS at 01:39

## 2021-11-18 RX ADMIN — SODIUM CHLORIDE 500 ML: 9 INJECTION, SOLUTION INTRAVENOUS at 16:00

## 2021-11-18 RX ADMIN — Medication 20 ML: at 14:00

## 2021-11-18 RX ADMIN — LIDOCAINE HYDROCHLORIDE 100 MG: 20 INJECTION, SOLUTION EPIDURAL; INFILTRATION; INTRACAUDAL; PERINEURAL at 13:26

## 2021-11-18 NOTE — CONSULTS
Consult    Patient: Pat Jacome MRN: [id number]  SSN: [id number]    YOB: 1955  Age: 74 y.o. Sex: female      Subjective:      Pat Jacome is a 74 y.o. female who is being seen for vaginal bleeding. Patient is status post CVA and is anticoagulated. She is aphasic and is unable to give history. .    Past Medical History:   Diagnosis Date    Aphasia as late effect of cerebrovascular accident     Cerebral aneurysm     Coronary artery disease     [facility] [address]     Dementia ([facility] [address]     Epilepsy ([facility] [address] [facility]     GERD (gastroesophageal reflux disease)     Hyperlipidemia     Hypertension     Ischemic optic neuropathy of left eye     Right hemiparesis ([facility] [address]      History reviewed. No pertinent surgical history.    Family History   Family history unknown: Yes     Social History     Tobacco Use    Smoking status: Not on file    Smokeless tobacco: Not on file   Substance Use Topics    Alcohol use: Not on file      Current Facility-Administered Medications   Medication Dose Route Frequency Provider Last Rate Last Admin    sodium chloride (NS) flush 5-40 mL  5-40 mL IntraVENous Q8H Giana Guerra MD   10 mL at 11/17/21 1608    sodium chloride (NS) flush 5-40 mL  5-40 mL IntraVENous PRN Giana Guerra MD        sodium chloride (NS) flush 5-40 mL  5-40 mL IntraVENous Q8H Frutoso Raf Yang MD        sodium chloride (NS) flush 5-40 mL  5-40 mL IntraVENous PRN Ming Bell MD        acetaminophen (TYLENOL) tablet 650 mg  650 mg Oral Q6H PRN Ming Bell MD        Or    acetaminophen (TYLENOL) suppository 650 mg  650 mg Rectal Q6H PRN Ming Bell MD        polyethylene glycol [facility]) packet 17 g  17 g Oral DAILY PRN Ming Bell MD        ondansetron (ZOFRAN ODT) tablet 4 mg  4 mg Oral Q8H PRN Ming Bell MD        Or    [facility] injection 4 mg  4 mg IntraVENous Q6H PRN Ming Bell MD        0.9% sodium chloride infusion 150 mL/hr IntraVENous CONTINUOUS Antony Lemon MD         Current Outpatient Medications   Medication Sig Dispense Refill    amLODIPine (NORVASC) 2.5 mg tablet Take 2.5 mg by mouth daily.  aspirin delayed-release 81 mg tablet Take 81 mg by mouth daily.  divalproex (Depakote Sprinkles) 125 mg capsule Take 125 mg by mouth two (2) times a day.  donepeziL (ARICEPT) 10 mg tablet Take 10 mg by mouth nightly.  prasugreL (Effient) 10 mg tablet Take 10 mg by mouth.  ferrous sulfate 220 mg (44 mg iron)/5 mL solution Take 220 mg by mouth two (2) times a day.  folic acid (FOLVITE) 1 mg tablet Take 1 mg by mouth daily.  gabapentin (NEURONTIN) 100 mg capsule Take 100 mg by mouth three (3) times daily.  glycopyrrolate (ROBINUL) 1 mg tablet Take 1 mg by mouth two (2) times a day.  memantine (NAMENDA) 10 mg tablet Take 10 mg by mouth daily.  magnesium hydroxide (Peña Milk of Magnesia) 400 mg/5 mL suspension Take 30 mL by mouth daily as needed for Constipation.  HYDROcodone-acetaminophen (Norco) 5-325 mg per tablet Take 1 Tablet by mouth every six (6) hours as needed for Pain.  benzocaine (ORAJEL) 20 % gel topical gel Apply  to affected area as needed for Pain.  potassium chloride (KLOR-CON M10) 10 mEq tablet Take 20 mEq by mouth daily.  simethicone (GAS-X) 125 mg capsule Take 125 mg by mouth four (4) times daily as needed for Flatulence.  traZODone (DESYREL) 50 mg tablet Take 50 mg by mouth nightly.  simvastatin (Zocor) 20 mg tablet Take 20 mg by mouth nightly.           No Known Allergies    Review of Systems:  Positive for vaginal bleeding    Objective:     Vitals:    11/17/21 1915 11/17/21 1930 11/17/21 1945 11/17/21 2000   BP: (!) 106/47 91/79 116/76 114/81   Pulse: 72 72 73 76   Resp: 18 19 17 20   Temp: 97.8 °F (36.6 °C) 97.8 °F (36.6 °C)     SpO2: 99% 100% 100% 99%   Weight:       Height:            Physical Exam:  Patient with vaginal bleeding which is moderate at times      Assessment:     Hospital Problems  Never Reviewed          Codes Class Noted POA    Vaginal bleeding ICD-10-CM: N93.9  ICD-9-CM: 623.8  11/17/2021 Unknown              Plan:     Transfuse 2 units of packed red blood cells. Patient will require endometrial biopsy at some time after stabilization.   Pelvic ultrasound ordered  Signed By: Danielle Du MD     November 17, 2021

## 2021-11-18 NOTE — ED NOTES
TRANSFER - OUT REPORT:    Verbal report given to [facility] on Tollie Scale  being transferred to icu room 280 for routine progression of care       Report consisted of patients Situation, Background, Assessment and   Recommendations(SBAR). Information from the following report(s) SBAR was reviewed with the receiving nurse. Lines:   Peripheral IV 11/17/21 Right Antecubital (Active)       Peripheral IV 11/17/21 Anterior; Left Forearm (Active)        Opportunity for questions and clarification was provided.       Patient transported with:   Monitor  Registered Nurse

## 2021-11-18 NOTE — ED NOTES
Pt started bleeding again after ultrasound. Baldo red blood noted coming from anus when cleaned. Dr. Baldev Moses called and ordered a repeat hb and hematocrit and consult gi. Same done.

## 2021-11-18 NOTE — CONSULTS
Pulmonology and Critical Care Consult    Subjective:   Consult Note: 11/18/2021 10:26 AM    Chief Complaint:   Chief Complaint   Patient presents with    Anal Bleeding        This patient has been seen and evaluated at the request of Dr. Foreign Moore. 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 Apparently was having bright red blood coming from either the vagina or from the rectum, was evaluated in the ER and CBC/BMP was stable, lactate normal at 1.2, LFTs normal, INR 1.1. Chest x-ray showed clear lungs bilaterally and she was saturating well on room air. Apparently was quite hypotensive requiring fluids and was given 2 units of packed red blood cells. Hemoglobin stable at 2.9 this morning, she is not requiring any vasopressors. Resting in bed comfortably without acute complaints. Was evaluated by OB/GYN on 11/17/2021 given concern for ?vaginal bleeding, they will continue to follow along. A pelvic ultrasound was negative for acute findings. A CTA of the abdomen/pelvis is now showing an enhancing linear structure in the perianal region which could be her source of bleeding, distention of the rectum which is concerning for possible impaction, and a questionable pancreatic head mass. Dr. Nesha Canales (GI) is following and is tentatively planning a flexible sigmoidoscopy today. Otherwise from a pulmonary standpoint, the patient denies any shortness of breath, cough, wheezing. I discussed the case with the bedside nursing staff and the hospitalist.  Of note, she is on aspirin and Effient at home.       Past Medical History:   Diagnosis Date    Aphasia as late effect of cerebrovascular accident     Cerebral aneurysm     Coronary artery disease     [facility]     Dementia ([facility] [facility]     Epilepsy ([facility] [address] [facility]     GERD (gastroesophageal reflux disease)     Hyperlipidemia     Hypertension     Ischemic optic neuropathy of left eye     Right hemiparesis ([address]      History reviewed. No pertinent surgical history.    Family History   Family history unknown: Yes     Social History     Tobacco Use    Smoking status: Not on file    Smokeless tobacco: Not on file   Substance Use Topics    Alcohol use: Not on file      Current Facility-Administered Medications   Medication Dose Route Frequency Provider Last Rate Last Admin    divalproex (DEPAKOTE SPRINKLE) capsule 125 mg  125 mg Oral BID Raf Eaton MD        folic acid (FOLVITE) tablet 1 mg  1 mg Oral DAILY Raf Eaton MD        donepeziL (ARICEPT) tablet 10 mg  10 mg Oral QHS Raf Eaton MD        memantine [facility]) tablet 10 mg  10 mg Oral DAILY Raf Eaton MD        simvastatin (ZOCOR) tablet 20 mg  20 mg Oral QHS Raf Eaton MD        traZODone (DESYREL) tablet 50 mg  50 mg Oral QHS Raf Eaton MD        0.45% sodium chloride infusion  75 mL/hr IntraVENous CONTINUOUS Monse Earing, DO 75 mL/hr at 11/18/21 0936 75 mL/hr at 11/18/21 0936    sodium chloride (NS) flush 5-40 mL  5-40 mL IntraVENous Q8H Sagar Mahajan MD   10 mL at 11/18/21 0600    sodium chloride (NS) flush 5-40 mL  5-40 mL IntraVENous PRN Sagar Mahajan MD        sodium chloride (NS) flush 5-40 mL  5-40 mL IntraVENous Raf Zapata MD        sodium chloride (NS) flush 5-40 mL  5-40 mL IntraVENous PRN Ivan Yost MD        acetaminophen (TYLENOL) tablet 650 mg  650 mg Oral Q6H PRN Ivan Yost MD        Or    acetaminophen (TYLENOL) suppository 650 mg  650 mg Rectal Q6H PRN Ivan Yost MD        polyethylene glycol [facility]) packet 17 g  17 g Oral DAILY PRN Ivan Yost MD        ondansetron (ZOFRAN ODT) tablet 4 mg  4 mg Oral Q8H PRN Ivan Yost MD        Or    ondansetron [facility]) injection 4 mg  4 mg IntraVENous Q6H PRN Ivan Yost MD Home Meds:  No current facility-administered medications on file prior to encounter. Current Outpatient Medications on File Prior to Encounter   Medication Sig Dispense Refill    amLODIPine (NORVASC) 2.5 mg tablet Take 2.5 mg by mouth daily.  aspirin delayed-release 81 mg tablet Take 81 mg by mouth daily.  divalproex (Depakote Sprinkles) 125 mg capsule Take 125 mg by mouth two (2) times a day.  donepeziL (ARICEPT) 10 mg tablet Take 10 mg by mouth nightly.  prasugreL (Effient) 10 mg tablet Take 10 mg by mouth.  ferrous sulfate 220 mg (44 mg iron)/5 mL solution Take 220 mg by mouth two (2) times a day.  folic acid (FOLVITE) 1 mg tablet Take 1 mg by mouth daily.  gabapentin (NEURONTIN) 100 mg capsule Take 100 mg by mouth three (3) times daily.  glycopyrrolate (ROBINUL) 1 mg tablet Take 1 mg by mouth two (2) times a day.  memantine (NAMENDA) 10 mg tablet Take 10 mg by mouth daily.  magnesium hydroxide (Peña Milk of Magnesia) 400 mg/5 mL suspension Take 30 mL by mouth daily as needed for Constipation.  HYDROcodone-acetaminophen (Norco) 5-325 mg per tablet Take 1 Tablet by mouth every six (6) hours as needed for Pain.  benzocaine (ORAJEL) 20 % gel topical gel Apply  to affected area as needed for Pain.  potassium chloride (KLOR-CON M10) 10 mEq tablet Take 20 mEq by mouth daily.  simethicone (GAS-X) 125 mg capsule Take 125 mg by mouth four (4) times daily as needed for Flatulence.  traZODone (DESYREL) 50 mg tablet Take 50 mg by mouth nightly.  simvastatin (Zocor) 20 mg tablet Take 20 mg by mouth nightly. No Known Allergies    Review of Systems:  A comprehensive review of systems was negative except for that written in the HPI. Objective:     Blood pressure 106/75, pulse 91, temperature 98.3 °F (36.8 °C), resp. rate 24, height 5' 6\" (1.676 m), weight 93 kg (205 lb 0.4 oz), SpO2 99 %.  Temp (24hrs), Av.9 °F (36.6 °C), Min:97.6 °F (36.4 °C), Max:98.4 °F (36.9 °C)      Intake and Output:  Current Shift: 11/18 0701 - 11/18 1900  In: 451.3 [I.V.:451.3]  Out: 300 [Urine:300]  Last 3 Shifts: 11/16 1901 - 11/18 0700  In: 300   Out: 600 [Urine:600]    Physical Exam:   General appearance: alert, cooperative, no distress, appears stated age  Head: Normocephalic, without obvious abnormality, atraumatic  Eyes: negative  Neck: supple, symmetrical, trachea midline, no adenopathy and no JVD  Lungs: clear to auscultation bilaterally, currently on room air  Heart: regular rate and rhythm, S1, S2 normal, no murmur, click, rub or gallop  Abdomen: soft, non-tender. Bowel sounds normal. No masses,  no organomegaly  Extremities: extremities normal, atraumatic, no cyanosis or edema  Pulses: 2+ and symmetric  Skin: Skin color, texture, turgor normal. No rashes or lesions  Lymph nodes: Cervical, supraclavicular, and axillary nodes normal.  Neurologic: Patient is aphasic but is following commands and answering questions appropriately, she is alert    Additional comments:None    Lab/Data Review: All lab results for the last 24 hours reviewed.   Recent Results (from the past 24 hour(s))   EKG, 12 LEAD, INITIAL    Collection Time: 11/17/21  2:48 PM   Result Value Ref Range    Ventricular Rate 75 BPM    Atrial Rate 75 BPM    P-R Interval 140 ms    QRS Duration 84 ms    Q-T Interval 376 ms    QTC Calculation (Bezet) 419 ms    Calculated P Axis 42 degrees    Calculated R Axis 17 degrees    Calculated T Axis 4 degrees    Diagnosis       Normal sinus rhythm  Normal ECG  No previous ECGs available  Confirmed by Mervat Obrien MD, Jin ([id number]) on 11/17/2021 9:59:30 PM     CBC WITH AUTOMATED DIFF    Collection Time: 11/17/21  2:53 PM   Result Value Ref Range    WBC 8.8 3.6 - 11.0 K/uL    RBC 4.11 3.80 - 5.20 M/uL    HGB 10.5 (L) 11.5 - 16.0 g/dL    HCT 34.8 (L) 35.0 - 47.0 %    MCV 84.7 80.0 - 99.0 FL    MCH 25.5 (L) 26.0 - 34.0 PG    MCHC 30.2 30.0 - 36.5 g/dL    RDW 17.3 (H) 11.5 - 14.5 %    PLATELET 275 177 - 947 K/uL    MPV 10.5 8.9 - 12.9 FL    NRBC 0.0 0.0  WBC    ABSOLUTE NRBC 0.00 0.00 - 0.01 K/uL    NEUTROPHILS 55 32 - 75 %    LYMPHOCYTES 33 12 - 49 %    MONOCYTES 9 5 - 13 %    EOSINOPHILS 2 0 - 7 %    BASOPHILS 1 0 - 1 %    IMMATURE GRANULOCYTES 0 0 - 0.5 %    ABS. NEUTROPHILS 4.9 1.8 - 8.0 K/UL    ABS. LYMPHOCYTES 2.9 0.8 - 3.5 K/UL    ABS. MONOCYTES 0.8 0.0 - 1.0 K/UL    ABS. EOSINOPHILS 0.2 0.0 - 0.4 K/UL    ABS. BASOPHILS 0.1 0.0 - 0.1 K/UL    ABS. IMM. GRANS. 0.0 0.00 - 0.04 K/UL    DF AUTOMATED     METABOLIC PANEL, COMPREHENSIVE    Collection Time: 11/17/21  2:53 PM   Result Value Ref Range    Sodium 142 136 - 145 mmol/L    Potassium 6.3 (H) 3.5 - 5.1 mmol/L    Chloride 113 (H) 97 - 108 mmol/L    CO2 28 21 - 32 mmol/L    Anion gap 1 (L) 5 - 15 mmol/L    Glucose 94 65 - 100 mg/dL    BUN 16 6 - 20 mg/dL    Creatinine 0.86 0.55 - 1.02 mg/dL    BUN/Creatinine ratio 19 12 - 20      GFR est AA >60 >60 ml/min/1.73m2    GFR est non-AA >60 >60 ml/min/1.73m2    Calcium 8.7 8.5 - 10.1 mg/dL    Bilirubin, total 0.4 0.2 - 1.0 mg/dL    AST (SGOT) 27 15 - 37 U/L    ALT (SGPT) 13 12 - 78 U/L    Alk.  phosphatase 69 45 - 117 U/L    Protein, total 6.8 6.4 - 8.2 g/dL    Albumin 2.4 (L) 3.5 - 5.0 g/dL    Globulin 4.4 (H) 2.0 - 4.0 g/dL    A-G Ratio 0.5 (L) 1.1 - 2.2     TYPE & SCREEN    Collection Time: 11/17/21  2:53 PM   Result Value Ref Range    Crossmatch Expiration 11/20/2021,2352     ABO/Rh(D) O Positive     Antibody screen Negative     Unit number [id number]     Blood component type  LR     Unit division 00     Status of unit Issued     [address] to transfuse     Crossmatch result Compatible     Unit number [id number]     Blood component type [facility]     Unit division 00     Status of unit [address] to transfuse     Crossmatch result Compatible    TROPONIN-HIGH SENSITIVITY    Collection Time: 11/17/21  2:53 PM   Result Value Ref Range Troponin-High Sensitivity 7 0 - 51 ng/L   PROTHROMBIN TIME + INR    Collection Time: 11/17/21  2:53 PM   Result Value Ref Range    Prothrombin time 13.2 11.9 - 14.7 sec    INR 1.0 0.9 - 1.1     CBC WITH AUTOMATED DIFF    Collection Time: 11/17/21  4:37 PM   Result Value Ref Range    WBC 8.9 3.6 - 11.0 K/uL    RBC 4.03 3.80 - 5.20 M/uL    HGB 10.3 (L) 11.5 - 16.0 g/dL    HCT 34.0 (L) 35.0 - 47.0 %    MCV 84.4 80.0 - 99.0 FL    MCH 25.6 (L) 26.0 - 34.0 PG    MCHC 30.3 30.0 - 36.5 g/dL    RDW 17.3 (H) 11.5 - 14.5 %    PLATELET 996 672 - 925 K/uL    NRBC 0.0 0.0  WBC    ABSOLUTE NRBC 0.00 0.00 - 0.01 K/uL    NEUTROPHILS 55 32 - 75 %    LYMPHOCYTES 33 12 - 49 %    MONOCYTES 9 5 - 13 %    EOSINOPHILS 2 0 - 7 %    BASOPHILS 1 0 - 1 %    IMMATURE GRANULOCYTES 0 0 - 0.5 %    ABS. NEUTROPHILS 4.9 1.8 - 8.0 K/UL    ABS. LYMPHOCYTES 3.0 0.8 - 3.5 K/UL    ABS. MONOCYTES 0.8 0.0 - 1.0 K/UL    ABS. EOSINOPHILS 0.2 0.0 - 0.4 K/UL    ABS. BASOPHILS 0.1 0.0 - 0.1 K/UL    ABS. IMM.  GRANS. 0.0 0.00 - 0.04 K/UL    DF AUTOMATED     POTASSIUM    Collection Time: 11/17/21  4:37 PM   Result Value Ref Range    Potassium 4.2 3.5 - 5.1 mmol/L   HEMOGLOBIN    Collection Time: 11/17/21  6:09 PM   Result Value Ref Range    HGB 9.3 (L) 11.5 - 16.0 g/dL   EMERGENT RELEASE OF UNCROSSMATCHED RED CELLS    Collection Time: 11/17/21  6:30 PM   Result Value Ref Range    Crossmatch Expiration 11/20/2021,2359     ABO/Rh(D) [place] Positive     Antibody screen Negative    HGB & HCT    Collection Time: 11/17/21 10:15 PM   Result Value Ref Range    HGB 11.4 (L) 11.5 - 16.0 g/dL    HCT 36.8 35.0 - 47.0 %   MRSA SCREEN - PCR (NASAL)    Collection Time: 11/18/21  1:30 AM   Result Value Ref Range    MRSA by PCR, Nasal Not Detected Not Detected     METABOLIC PANEL, COMPREHENSIVE    Collection Time: 11/18/21  3:00 AM   Result Value Ref Range    Sodium 145 136 - 145 mmol/L    Potassium 3.9 3.5 - 5.1 mmol/L    Chloride 115 (H) 97 - 108 mmol/L    CO2 26 21 - 32 mmol/L    Anion gap 4 (L) 5 - 15 mmol/L    Glucose 115 (H) 65 - 100 mg/dL    BUN 11 6 - 20 mg/dL    Creatinine 0.62 0.55 - 1.02 mg/dL    BUN/Creatinine ratio 18 12 - 20      GFR est AA >60 >60 ml/min/1.73m2    GFR est non-AA >60 >60 ml/min/1.73m2    Calcium 7.2 (L) 8.5 - 10.1 mg/dL    Bilirubin, total 0.2 0.2 - 1.0 mg/dL    AST (SGOT) 11 (L) 15 - 37 U/L    ALT (SGPT) 7 (L) 12 - 78 U/L    Alk. phosphatase 59 45 - 117 U/L    Protein, total 5.7 (L) 6.4 - 8.2 g/dL    Albumin 2.3 (L) 3.5 - 5.0 g/dL    Globulin 3.4 2.0 - 4.0 g/dL    A-G Ratio 0.7 (L) 1.1 - 2.2     POTASSIUM    Collection Time: 11/18/21  3:00 AM   Result Value Ref Range    Potassium 3.7 3.5 - 5.1 mmol/L   LACTIC ACID    Collection Time: 11/18/21  3:00 AM   Result Value Ref Range    Lactic acid 1.2 0.4 - 2.0 mmol/L   MAGNESIUM    Collection Time: 11/18/21  3:00 AM   Result Value Ref Range    Magnesium 1.8 1.6 - 2.4 mg/dL   CBC WITH AUTOMATED DIFF    Collection Time: 11/18/21  3:00 AM   Result Value Ref Range    WBC 9.2 3.6 - 11.0 K/uL    RBC 4.11 3.80 - 5.20 M/uL    HGB 10.9 (L) 11.5 - 16.0 g/dL    HCT 35.2 35.0 - 47.0 %    MCV 85.6 80.0 - 99.0 FL    MCH 26.5 26.0 - 34.0 PG    MCHC 31.0 30.0 - 36.5 g/dL    RDW 17.0 (H) 11.5 - 14.5 %    PLATELET 001 886 - 358 K/uL    MPV 10.5 8.9 - 12.9 FL    NRBC 0.0 0.0  WBC    ABSOLUTE NRBC 0.00 0.00 - 0.01 K/uL    NEUTROPHILS 70 32 - 75 %    LYMPHOCYTES 21 12 - 49 %    MONOCYTES 8 5 - 13 %    EOSINOPHILS 1 0 - 7 %    BASOPHILS 0 0 - 1 %    IMMATURE GRANULOCYTES 0 0 - 0.5 %    ABS. NEUTROPHILS 6.3 1.8 - 8.0 K/UL    ABS. LYMPHOCYTES 1.9 0.8 - 3.5 K/UL    ABS. MONOCYTES 0.8 0.0 - 1.0 K/UL    ABS. EOSINOPHILS 0.1 0.0 - 0.4 K/UL    ABS. BASOPHILS 0.0 0.0 - 0.1 K/UL    ABS. IMM.  GRANS. 0.0 0.00 - 0.04 K/UL    DF AUTOMATED     PROTHROMBIN TIME + INR    Collection Time: 11/18/21  3:00 AM   Result Value Ref Range    Prothrombin time 13.6 11.9 - 14.7 sec    INR 1.1 0.9 - 1.1     PTT    Collection Time: 11/18/21  3:00 AM Result Value Ref Range    aPTT 29.2 21.2 - 34.1 sec    aPTT, therapeutic range   82 - 109 sec         Chest X-Ray:   CTA ABD PELV W WO CONT   Final Result   1. Enhancing linear structure in the perianal region which is not seen on the   delayed images. This could represent a prominent vessel/external hemorrhoid or   may represent active bleeding. . There is adjacent gas and fluid and possibility   of a perianal abscess cannot be excluded. 2. Distention of the rectum with fecal material concerning for impaction. 3. Subtle low-density focus in the pancreatic head concerning for a mass,   recommend follow-up. 4. Other findings as described. US PELV NON OBS   Final Result   Limited study with no acute findings. XR CHEST PORT   Final Result          CT imaging:  CT Results  (Last 48 hours)               11/17/21 2313  CTA ABD PELV W WO CONT Final result    Impression:  1. Enhancing linear structure in the perianal region which is not seen on the   delayed images. This could represent a prominent vessel/external hemorrhoid or   may represent active bleeding. . There is adjacent gas and fluid and possibility   of a perianal abscess cannot be excluded. 2. Distention of the rectum with fecal material concerning for impaction. 3. Subtle low-density focus in the pancreatic head concerning for a mass,   recommend follow-up. 4. Other findings as described. Narrative:  Axial images from the lung bases to the pubic symphysis were obtained prior to   and following intravenous administration of 100 mL Isovue-370. Images were   obtained during arterial, portal venous and renal excretory phases of   enhancement. Sagittal and coronal reformatted images were reviewed along with   maximum intensity projected images.  All CT scans at this facility are performed   using dose reduction optimization techniques as appropriate to a performed exam   including the following:   automated exposure control, adjustments of the mA   and/or kV according to patient size, or use of iterative reconstruction   technique. INDICATION: Rectal bleeding. There are atelectatic changes at the lung bases. Heart size is enlarged. Cholecystectomy clips are seen. Noncontrasted images shows a 12 mm cyst in the   right hepatic lobe with adjacent calcification. There is an IVC filter in place. A few tiny nonobstructing left renal calcifications. GI tract shows no evidence   of obstruction. There is distention of the rectum with fecal material. Uterus   and urinary bladder are unremarkable. There is 2.2 cm transverse diameter   fat-containing umbilical hernia. Following contrast administration, small low-density lesion in the lateral   segment left hepatic lobe appears stable. Spleen and adrenal glands are   unremarkable. There is a subtle area of decreased density in the pancreatic head   measuring 16 x 18 mm. There is no pancreatic ductal dilatation. There is a   linear focus of enhancement in the midline perianal region which appears to be   outside of the expected confines of the GI tract. There are a few adjacent gas   locules. Portal venous phase images shows the area of decreased attenuation in   the pancreatic head to be slightly better visualized measuring 13 x 16 mm. Abdominal aorta exhibits no focal aneurysm or dissection. Except for a a few   small renal cysts, the kidneys enhance symmetrically. No hydronephrosis. No   retroperitoneal adenopathy. Urinary bladder is distended. There is persistence   linear high density in the perianal region in the midline suggesting prominent   vessel or active bleeding. This does not change in distribution however. On the delayed images, the enhanced products seen on the earlier postcontrast   images is not seen. Delayed images show normal caliber collecting systems and   ureters.  The area of decreased enhancement in the pancreatic head measures   approximate 14 x 20 mm and abuts the superior mesenteric vein. IVC filter with   no evidence of thrombus in the IVC or pelvic veins. Normal excretion of contrast   in the collecting systems and ureters and normal filling of the urinary   bladder. . There is a right common iliac artery stent. Bone windows and   reconstructed images show mild degenerative changes of the spine with no acute   bony findings. Maximum intensity projected images show no significant additional   vascular abnormalities. 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 Plan:     1.)  Acute lower GI bleeding  -Presented with bleeding/hypotensive, hemoglobin dropped to 9.3. Received 2 units of packed red blood cells and hemoglobin went up to 11.4.  -Now hemoglobin stable at 10.9 this morning.  -Has never required vasopressors, very hemodynamically stable at this time. No tachycardia, lactate is normal.  -Was a concern for vaginal bleeding however suspect that this is actually from rectal/perianal bleeding. -GI following, planning a flexible sigmoidoscopy today  -Appreciate assistance from OB/GYN  -Continue to hold antiplatelet agents    2.)  Acute blood loss anemia  -Presented with bleeding, hemoglobin dropped to 9.3. Received 2 units of packed red blood cells and hemoglobin went up to 11.4.  -Now hemoglobin stable at 10.9 this morning.  -Was a concern for vaginal bleeding however suspect that this is actually from rectal/perianal bleeding.   -GI following, planning a flexible sigmoidoscopy today  -Repeat CBC in the morning    3.)  Hypertension  -Blood pressure remained stable now after fluids/blood products, continue to hold home antihypertensive agents for now    4.)  History of CVA/dementia  -Continue home Namenda/Aricept and hyperlipidemia medications  -Holding home antiplatelet agents given bleeding, may need neurology to weigh in as to timing of appropriate re-initiation  -When she is able to eat, would recommend speech therapy clearance for diet        CODE STATUS: Full Code    Lines/Tubes: Peripheral IV x2    Prophylaxis:  Stress Ulcer Protocol Active: No  Deep Vein Thrombosis Protocol Active: Yes, SCD's  Nutrition: NPO for now  Activity: Bedrest for now      Disposition and Family: Remain in ICU for now until endoscopy    Total time spent with patient: 50 minutes      Leigha Carrasco DO  [facility]  11/18/2021  10:26 AM

## 2021-11-18 NOTE — ANESTHESIA PREPROCEDURE EVALUATION
Relevant Problems   No relevant active problems       Anesthetic History   No history of anesthetic complications            Review of Systems / Medical History  Patient summary reviewed, nursing notes reviewed and pertinent labs reviewed    Pulmonary  Within defined limits                 Neuro/Psych     seizures  CVA  TIA     Cardiovascular    Hypertension          CAD and hyperlipidemia    Exercise tolerance: <4 METS     GI/Hepatic/Renal     GERD           Endo/Other        Obesity     Other Findings            Past Medical History:   Diagnosis Date    Aphasia as late effect of cerebrovascular accident     Cerebral aneurysm     Coronary artery disease     [facility]     Dementia ([facility]     [facility]     GERD (gastroesophageal reflux disease)     Hyperlipidemia     Hypertension     Ischemic optic neuropathy of left eye     Right hemiparesis ([facility]        History reviewed. No pertinent surgical history.     Current Outpatient Medications   Medication Instructions    amLODIPine (NORVASC) 2.5 mg, Oral, DAILY    aspirin delayed-release 81 mg, Oral, DAILY    benzocaine (ORAJEL) 20 % gel topical gel Topical, AS NEEDED    divalproex (DEPAKOTE SPRINKLES) 125 mg, Oral, 2 TIMES DAILY    donepeziL (ARICEPT) 10 mg, Oral, EVERY BEDTIME    ferrous sulfate 220 mg, Oral, 2 TIMES DAILY    folic acid (FOLVITE) 1 mg, Oral, DAILY    gabapentin (NEURONTIN) 100 mg, Oral, 3 TIMES DAILY    glycopyrrolate (ROBINUL) 1 mg, Oral, 2 TIMES DAILY    HYDROcodone-acetaminophen (Norco) 5-325 mg per tablet 1 Tablet, Oral, EVERY 6 HOURS AS NEEDED    magnesium hydroxide (Peña Milk of Magnesia) 400 mg/5 mL suspension 30 mL, Oral, DAILY PRN    memantine (NAMENDA) 10 mg, Oral, DAILY    potassium chloride (KLOR-CON M10) 10 mEq tablet 20 mEq, Oral, DAILY    prasugreL (EFFIENT) 10 mg, Oral    simethicone (GAS-X) 125 mg, Oral, 4 TIMES DAILY AS NEEDED    simvastatin (ZOCOR) 20 mg, Oral, EVERY BEDTIME    traZODone (DESYREL) 50 mg, Oral, EVERY BEDTIME       Current Facility-Administered Medications   Medication Dose Route Frequency    divalproex (DEPAKOTE SPRINKLE) capsule 125 mg  125 mg Oral BID    folic acid (FOLVITE) tablet 1 mg  1 mg Oral DAILY    donepeziL (ARICEPT) tablet 10 mg  10 mg Oral QHS    memantine (NAMENDA) tablet 10 mg  10 mg Oral DAILY    simvastatin (ZOCOR) tablet 20 mg  20 mg Oral QHS    traZODone (DESYREL) tablet 50 mg  50 mg Oral QHS    0.45% sodium chloride infusion  75 mL/hr IntraVENous CONTINUOUS    sodium chloride (NS) flush 5-40 mL  5-40 mL IntraVENous Q8H    sodium chloride (NS) flush 5-40 mL  5-40 mL IntraVENous PRN    sodium chloride (NS) flush 5-40 mL  5-40 mL IntraVENous Q8H    sodium chloride (NS) flush 5-40 mL  5-40 mL IntraVENous PRN    acetaminophen (TYLENOL) tablet 650 mg  650 mg Oral Q6H PRN    Or    acetaminophen (TYLENOL) suppository 650 mg  650 mg Rectal Q6H PRN    polyethylene glycol (MIRALAX) packet 17 g  17 g Oral DAILY PRN    ondansetron (ZOFRAN ODT) tablet 4 mg  4 mg Oral Q8H PRN    Or    ondansetron (ZOFRAN) injection 4 mg  4 mg IntraVENous Q6H PRN       Patient Vitals for the past 24 hrs:   Temp Pulse Resp BP SpO2   11/18/21 0900  91 24 106/75 99 %   11/18/21 0800  91 20 110/80 98 %   11/18/21 0700 36.8 °C (98.3 °F) 85 19 109/75 100 %   11/18/21 0600  80 16 121/75 99 %   11/18/21 0500  71 16 111/74 100 %   11/18/21 0400  89 16 94/65 100 %   11/18/21 0300 36.6 °C (97.8 °F) 67 13 114/80 99 %   11/18/21 0200  67 13 100/70 99 %   11/18/21 0109 36.6 °C (97.8 °F) 72 15  99 %   11/18/21 0000  74 15 102/75 100 %   11/17/21 2330  68 20 126/65 97 %   11/17/21 2300  75 15 118/69 100 %   11/17/21 2230  75 21 103/73 98 %   11/17/21 2200  77 21 (!) 125/106 98 %   11/17/21 2130  75 13 90/64 99 %   11/17/21 2100  69 12 106/66 99 %   11/17/21 2030 36.7 °C (98 °F) 78 21 118/75 98 %   11/17/21 2000  76 20 114/81 99 %   11/17/21 1945  73 17 116/76 100 %   11/17/21 1930 36.6 °C (97.8 °F) 72 19 91/79 100 %   11/17/21 1915 36.6 °C (97.8 °F) 72 18 (!) 106/47 99 %   11/17/21 1906  70 16 117/72 95 %   11/17/21 1900  71 16 (!) 106/56 95 %   11/17/21 1858  73 14 (!) 105/58 95 %   11/17/21 1845 36.4 °C (97.6 °F) 70 16 (!) 87/54 95 %   11/17/21 1830 36.4 °C (97.6 °F) 75 22 102/71 94 %   11/17/21 1813  76 21 (!) 74/56 96 %   11/17/21 1805  97 (!) 34 (!) 57/41    11/17/21 1752  (!) 111 23 92/78 97 %   11/17/21 1723     98 %   11/17/21 1721  (!) 112  (!) 80/58 99 %   11/17/21 1607  89  104/80 98 %   11/17/21 1528  96 22 133/75 98 %   11/17/21 1505  85 20 (!) 85/66 98 %   11/17/21 1434 36.9 °C (98.4 °F) 81 20 109/74 97 %       Lab Results   Component Value Date/Time    WBC 9.2 11/18/2021 03:00 AM    HGB 10.9 (L) 11/18/2021 03:00 AM    HCT 35.2 11/18/2021 03:00 AM    PLATELET 745 82/87/1223 03:00 AM    MCV 85.6 11/18/2021 03:00 AM     Lab Results   Component Value Date/Time    Sodium 145 11/18/2021 03:00 AM    Potassium 3.9 11/18/2021 03:00 AM    Potassium 3.7 11/18/2021 03:00 AM    Chloride 115 (H) 11/18/2021 03:00 AM    CO2 26 11/18/2021 03:00 AM    Anion gap 4 (L) 11/18/2021 03:00 AM    Glucose 115 (H) 11/18/2021 03:00 AM    BUN 11 11/18/2021 03:00 AM    Creatinine 0.62 11/18/2021 03:00 AM    BUN/Creatinine ratio 18 11/18/2021 03:00 AM    GFR est AA >60 11/18/2021 03:00 AM    GFR est non-AA >60 11/18/2021 03:00 AM    Calcium 7.2 (L) 11/18/2021 03:00 AM     Lab Results   Component Value Date/Time    APTT 29.2 11/18/2021 03:00 AM    PTP 13.6 11/18/2021 03:00 AM    INR 1.1 11/18/2021 03:00 AM     Lab Results   Component Value Date/Time    Glucose 115 (H) 11/18/2021 03:00 AM     Physical Exam    Airway  Mallampati: II  TM Distance: 4 - 6 cm  Neck ROM: decreased range of motion   Mouth opening: Normal     Cardiovascular    Rhythm: regular  Rate: normal         Dental    Dentition: Edentulous     Pulmonary  Breath sounds clear to auscultation Abdominal  GI exam deferred       Other Findings            Anesthetic Plan    ASA: 3, emergent  Anesthesia type: total IV anesthesia and MAC          Induction: Intravenous  Anesthetic plan and risks discussed with: Patient and Family

## 2021-11-18 NOTE — ANESTHESIA POSTPROCEDURE EVALUATION
Procedure(s):  SIGMOIDOSCOPY.    total IV anesthesia, MAC    Anesthesia Post Evaluation        Patient location during evaluation: ICU  Level of consciousness: obtunded/minimal responses  Pain score: 0  Airway patency: patent  Anesthetic complications: no  Cardiovascular status: acceptable  Respiratory status: acceptable  Hydration status: acceptable  Comments: This patient was transferred to the ICU by anesthesia personnel. The patient was handed off to the ICU nursing team.  All questions regarding pre-, intra-, and postoperative care were answered. Post anesthesia nausea and vomiting:  controlled  Final Post Anesthesia Temperature Assessment:  Normothermia (36.0-37.5 degrees C)      INITIAL Post-op Vital signs: No vitals data found for the desired time range.

## 2021-11-18 NOTE — PROGRESS NOTES
14: 50PM Outbound call to listed guardian (Swathi Gilmore), @ [phone]. Identified self, role, and nature of the call re: needing guardianship documents to update patient's chart. Ms. Lynda Nugent voiced \"her name is not correct in your system. Court documents show her name is Althea Rodney. \"  Acknowledged understanding. Once documents have been recv'd will contact patient registration to correct this error. Provided fax# to send court ordered guardianship documents. Agreed to fax.         Romayne Clan, ALLISON

## 2021-11-18 NOTE — PROGRESS NOTES
Progress Note    Patient: Carlos Wilkins MRN: [id number]  SSN: [id number]    YOB: 1955  Age: 74 y.o. Sex: female      Admit Date: 11/17/2021    LOS: 1 day     Subjective:     66F, h/o CVA with Right sided hemiplegia with aphasia on prasugrel with LGIB with ABLA     S/p sigmoidscope- bleeding hemorrhoids s/p clips and epinephrine    Received 2 PRBC    Monitor in ICU, repeat in AM    Review of Systems:  Constitutional:  denies weight loss, no fever, no chills, no night sweats  Eye: No recent visual disturbances, no discharge, no double vision  Ear/nose/mouth/throat : No hearing disturbance, no ear pain, no nasal congestion, no sore throat, no trouble swallowing. Respiratory : No trouble breathing, no cough, no shortness of breath, no hemoptysis, no wheezing  Cardiovascular : No chest pain, no palpitation, no racing of heart, no orthopnea, no paroxysmal nocturnal dyspnea, no peripheral edema  Gastrointestinal : No nausea, no vomiting, no diarrhea, constipation, heartburn, abdominal pain  Genitourinary : No dysuria, no hematuria, no increased frequency, incontinence,  Lymphatics : No swollen glands -Neck, axillary, inguinal  Endocrine : No excessive thirst, no polyuria no cold intolerance, no heat intolerance. Immunologic : No hives, urticaria, no seasonal allergies,   Musculoskeletal : Left upper back pain.   No joint swelling, pain, effusion,  no neck pain,   Integumentary : No rash, no pruritus, no ecchymosis  Hematology : No petechiae, No easy bruising,  No tendency to bleed easy  Neurology : Denies change in mental status, no abnormal balance, no headache, no confusion, numbness, tingling,  Psychiatric : No mood swings, no anxiety, depression      Objective:     Vitals:    11/18/21 1600 11/18/21 1634 11/18/21 1703 11/18/21 1713   BP:  97/76 (!) 110/59 (!) 104/91   Pulse: (!) 101 86 93 84   Resp: 12 12 15 17   Temp:  97.9 °F (36.6 °C) 97.9 °F (36.6 °C) 98.3 °F (36.8 °C) SpO2: 100% 100% 99% 100%   Weight:       Height:            Intake and Output:  Current Shift: 11/18 0701 - 11/18 1900  In: 451.3 [I.V.:451.3]  Out: 300 [Urine:300]  Last three shifts: 11/16 1901 - 11/18 0700  In: 300   Out: 600 [Urine:600]      Physical Exam:      Physical Exam   Constitutional: Awake and alert, interactive, NAD  Eyes: PERRL, no injection or scleral icterus, no discharge  HEENT: NCAT, neck supple, MMM, no oropharyngeal exudates  CV: RRR, no m/r/g  Respiratory: CTAB, no r/r/w  GI: Abd soft, nondistended, nontender  : No bleeding from rectum, gross blood from vagina  MSK: FROM, no joint effusions or edema  Skin: No rashes  Neuro: Symmetric facies, aphasic, R-sided hemiplegia         Lab/Data Review:  Recent Results (from the past 24 hour(s))   HEMOGLOBIN    Collection Time: 11/17/21  6:09 PM   Result Value Ref Range    HGB 9.3 (L) 11.5 - 16.0 g/dL   EMERGENT RELEASE OF UNCROSSMATCHED RED CELLS    Collection Time: 11/17/21  6:30 PM   Result Value Ref Range    Crossmatch Expiration 11/20/2021,2359     ABO/Rh(D) Gale Ni Positive     Antibody screen Negative    HGB & HCT    Collection Time: 11/17/21 10:15 PM   Result Value Ref Range    HGB 11.4 (L) 11.5 - 16.0 g/dL    HCT 36.8 35.0 - 47.0 %   MRSA SCREEN - PCR (NASAL)    Collection Time: 11/18/21  1:30 AM   Result Value Ref Range    MRSA by PCR, Nasal Not Detected Not Detected     METABOLIC PANEL, COMPREHENSIVE    Collection Time: 11/18/21  3:00 AM   Result Value Ref Range    Sodium 145 136 - 145 mmol/L    Potassium 3.9 3.5 - 5.1 mmol/L    Chloride 115 (H) 97 - 108 mmol/L    CO2 26 21 - 32 mmol/L    Anion gap 4 (L) 5 - 15 mmol/L    Glucose 115 (H) 65 - 100 mg/dL    BUN 11 6 - 20 mg/dL    Creatinine 0.62 0.55 - 1.02 mg/dL    BUN/Creatinine ratio 18 12 - 20      GFR est AA >60 >60 ml/min/1.73m2    GFR est non-AA >60 >60 ml/min/1.73m2    Calcium 7.2 (L) 8.5 - 10.1 mg/dL    Bilirubin, total 0.2 0.2 - 1.0 mg/dL    AST (SGOT) 11 (L) 15 - 37 U/L    ALT (SGPT) 7 (L) 12 - 78 U/L    Alk. phosphatase 59 45 - 117 U/L    Protein, total 5.7 (L) 6.4 - 8.2 g/dL    Albumin 2.3 (L) 3.5 - 5.0 g/dL    Globulin 3.4 2.0 - 4.0 g/dL    A-G Ratio 0.7 (L) 1.1 - 2.2     POTASSIUM    Collection Time: 11/18/21  3:00 AM   Result Value Ref Range    Potassium 3.7 3.5 - 5.1 mmol/L   LACTIC ACID    Collection Time: 11/18/21  3:00 AM   Result Value Ref Range    Lactic acid 1.2 0.4 - 2.0 mmol/L   MAGNESIUM    Collection Time: 11/18/21  3:00 AM   Result Value Ref Range    Magnesium 1.8 1.6 - 2.4 mg/dL   CBC WITH AUTOMATED DIFF    Collection Time: 11/18/21  3:00 AM   Result Value Ref Range    WBC 9.2 3.6 - 11.0 K/uL    RBC 4.11 3.80 - 5.20 M/uL    HGB 10.9 (L) 11.5 - 16.0 g/dL    HCT 35.2 35.0 - 47.0 %    MCV 85.6 80.0 - 99.0 FL    MCH 26.5 26.0 - 34.0 PG    MCHC 31.0 30.0 - 36.5 g/dL    RDW 17.0 (H) 11.5 - 14.5 %    PLATELET 204 847 - 486 K/uL    MPV 10.5 8.9 - 12.9 FL    NRBC 0.0 0.0  WBC    ABSOLUTE NRBC 0.00 0.00 - 0.01 K/uL    NEUTROPHILS 70 32 - 75 %    LYMPHOCYTES 21 12 - 49 %    MONOCYTES 8 5 - 13 %    EOSINOPHILS 1 0 - 7 %    BASOPHILS 0 0 - 1 %    IMMATURE GRANULOCYTES 0 0 - 0.5 %    ABS. NEUTROPHILS 6.3 1.8 - 8.0 K/UL    ABS. LYMPHOCYTES 1.9 0.8 - 3.5 K/UL    ABS. MONOCYTES 0.8 0.0 - 1.0 K/UL    ABS. EOSINOPHILS 0.1 0.0 - 0.4 K/UL    ABS. BASOPHILS 0.0 0.0 - 0.1 K/UL    ABS. IMM.  GRANS. 0.0 0.00 - 0.04 K/UL    DF AUTOMATED     PROTHROMBIN TIME + INR    Collection Time: 11/18/21  3:00 AM   Result Value Ref Range    Prothrombin time 13.6 11.9 - 14.7 sec    INR 1.1 0.9 - 1.1     PTT    Collection Time: 11/18/21  3:00 AM   Result Value Ref Range    aPTT 29.2 21.2 - 34.1 sec    aPTT, therapeutic range   82 - 109 sec   CBC WITH AUTOMATED DIFF    Collection Time: 11/18/21 10:25 AM   Result Value Ref Range    WBC 11.6 (H) 3.6 - 11.0 K/uL    RBC 3.51 (L) 3.80 - 5.20 M/uL    HGB 9.5 (L) 11.5 - 16.0 g/dL    HCT 30.2 (L) 35.0 - 47.0 %    MCV 86.0 80.0 - 99.0 FL    MCH 27.1 26.0 - 34.0 PG    MCHC 31.5 30.0 - 36.5 g/dL    RDW 17.0 (H) 11.5 - 14.5 %    PLATELET 725 392 - 724 K/uL    NRBC 0.0 0.0  WBC    ABSOLUTE NRBC 0.00 0.00 - 0.01 K/uL    NEUTROPHILS 65 32 - 75 %    LYMPHOCYTES 25 12 - 49 %    MONOCYTES 9 5 - 13 %    EOSINOPHILS 1 0 - 7 %    BASOPHILS 0 0 - 1 %    IMMATURE GRANULOCYTES 0 0 - 0.5 %    ABS. NEUTROPHILS 7.5 1.8 - 8.0 K/UL    ABS. LYMPHOCYTES 2.9 0.8 - 3.5 K/UL    ABS. MONOCYTES 1.0 0.0 - 1.0 K/UL    ABS. EOSINOPHILS 0.1 0.0 - 0.4 K/UL    ABS. BASOPHILS 0.1 0.0 - 0.1 K/UL    ABS. IMM. GRANS. 0.0 0.00 - 0.04 K/UL    DF AUTOMATED     GLUCOSE, POC    Collection Time: 11/18/21  2:34 PM   Result Value Ref Range    Glucose (POC) 97 65 - 117 mg/dL    Performed by Antonino Martines    CBC WITH AUTOMATED DIFF    Collection Time: 11/18/21  3:00 PM   Result Value Ref Range    WBC 16.9 (H) 3.6 - 11.0 K/uL    RBC 2.92 (L) 3.80 - 5.20 M/uL    HGB 7.9 (L) 11.5 - 16.0 g/dL    HCT 25.3 (L) 35.0 - 47.0 %    MCV 86.6 80.0 - 99.0 FL    MCH 27.1 26.0 - 34.0 PG    MCHC 31.2 30.0 - 36.5 g/dL    RDW 17.0 (H) 11.5 - 14.5 %    PLATELET 841 061 - 013 K/uL    MPV 10.7 8.9 - 12.9 FL    NRBC 0.0 0.0  WBC    ABSOLUTE NRBC 0.00 0.00 - 0.01 K/uL    NEUTROPHILS 71 32 - 75 %    LYMPHOCYTES 20 12 - 49 %    MONOCYTES 8 5 - 13 %    EOSINOPHILS 1 0 - 7 %    BASOPHILS 0 0 - 1 %    IMMATURE GRANULOCYTES 0 0 - 0.5 %    ABS. NEUTROPHILS 11.9 (H) 1.8 - 8.0 K/UL    ABS. LYMPHOCYTES 3.4 0.8 - 3.5 K/UL    ABS. MONOCYTES 1.4 (H) 0.0 - 1.0 K/UL    ABS. EOSINOPHILS 0.1 0.0 - 0.4 K/UL    ABS. BASOPHILS 0.1 0.0 - 0.1 K/UL    ABS. IMM. GRANS. 0.1 (H) 0.00 - 0.04 K/UL    DF AUTOMATED           Assessment and plan:      (1) LGIB with ABLA: 2 PRBC, S/p S/p sigmoidscope- bleeding hemorrhoids s/p clips and epinephrine. Received 2 PRBC. Monitor in ICU, repeat in AM    (2) ANEMIA: s/t #1. Transfuse to keep Hb > 7 or active bleed    (3) CVA: hold prasgurel and aspirin. Hold for now. DVT ppx: SCD    DISPO: DC hh when stable in 1-2 days.  \ Signed By: Anjali Duenas MD     November 18, 2021

## 2021-11-18 NOTE — ED NOTES
Dr. Abel Noguera called and updated about patient. Ordered a cta abdomen and pelvis. Keep NPO.  Admit to ICU

## 2021-11-18 NOTE — CONSULTS
Consult    Patient: Juliane Hobson MRN: [id number]  SSN: [id number]    YOB: 1955  Age: 74 y.o. Sex: female      Subjective:      Juliane Hobson is a 74 y.o. female who is being seen for rectal bleeding. Hx from chart  medical history significant for CVA w/R-sided deficits and aphasia, on anticoagulation with prasugrel. presents to hospital from [address] [place] [address] with what was initially thought to be vaginal  bleeding. During the ED course, determined to be rectal leeding. Stable hemoglobin after RBC transfusion. to admit to ICU overnight , more bleeding from rectum per ICU RN   Past Medical History:   Diagnosis Date    Aphasia as late effect of cerebrovascular accident     Cerebral aneurysm     Coronary artery disease     [address]A (cerebral vascular accident) ([address]     [facility]     Epilepsy ([address]     GERD (gastroesophageal reflux disease)     Hyperlipidemia     Hypertension     Ischemic optic neuropathy of left eye     Right hemiparesis ([address]      History reviewed. No pertinent surgical history.    Family History   Family history unknown: Yes     Social History     Tobacco Use    Smoking status: Not on file    Smokeless tobacco: Not on file   Substance Use Topics    Alcohol use: Not on file      Current Facility-Administered Medications   Medication Dose Route Frequency Provider Last Rate Last Admin    divalproex (DEPAKOTE SPRINKLE) capsule 125 mg  125 mg Oral BID Raf Wei MD        folic acid (FOLVITE) tablet 1 mg  1 mg Oral DAILY Raf Wei MD        donepeziL (ARICEPT) tablet 10 mg  10 mg Oral QHS Raf Wei MD        memantine [facility]) tablet 10 mg  10 mg Oral DAILY Raf Wei MD        simvastatin (ZOCOR) tablet 20 mg  20 mg Oral QHS Raf Wei MD        traZODone (DESYREL) tablet 50 mg  50 mg Oral QHS Raf Wei MD        0.45% sodium chloride infusion  75 mL/hr IntraVENous CONTINUOUS Antonio Urena DO 75 mL/hr at 11/18/21 0936 75 mL/hr at 11/18/21 0936    sodium chloride (NS) flush 5-40 mL  5-40 mL IntraVENous Q8H Giana Guerra MD   10 mL at 11/18/21 0600    sodium chloride (NS) flush 5-40 mL  5-40 mL IntraVENous PRN Giana Guerra MD        sodium chloride (NS) flush 5-40 mL  5-40 mL IntraVENous Q8H Raf Munguia MD        sodium chloride (NS) flush 5-40 mL  5-40 mL IntraVENous PRN Antony Lemon MD        acetaminophen (TYLENOL) tablet 650 mg  650 mg Oral Q6H PRN Antony Lemon MD        Or    acetaminophen (TYLENOL) suppository 650 mg  650 mg Rectal Q6H PRN Antony Lemon MD        polyethylene glycol [facility]) packet 17 g  17 g Oral DAILY PRN Antony Lemon MD        ondansetron (ZOFRAN ODT) tablet 4 mg  4 mg Oral Q8H PRN Antony Lemon MD        Or    ondansetron [facility]) injection 4 mg  4 mg IntraVENous Q6H PRN Antony Lemon MD            No Known Allergies    Review of Systems:  Review of Systems   Unable to perform ROS: Medical condition        Objective:     Vitals:    11/18/21 0600 11/18/21 0700 11/18/21 0800 11/18/21 0900   BP: 121/75 109/75 110/80 106/75   Pulse: 80 85 91 91   Resp: 16 19 20 24   Temp:  98.3 °F (36.8 °C)     SpO2: 99% 100% 98% 99%   Weight:       Height:            Physical Exam:  Physical Exam  Constitutional:       Appearance: She is ill-appearing. HENT:      Head: Normocephalic. Eyes:      Extraocular Movements: Extraocular movements intact. Cardiovascular:      Rate and Rhythm: Normal rate. Pulmonary:      Effort: Pulmonary effort is normal.   Abdominal:      General: Abdomen is flat. Musculoskeletal:         General: Deformity present. Cervical back: Rigidity present. Neurological:      Mental Status: Mental status is at baseline.           Recent Results (from the past 24 hour(s))   EKG, 12 LEAD, INITIAL    Collection Time: 11/17/21  2:48 PM   Result Value Ref Range    Ventricular Rate 75 BPM    Atrial Rate 75 BPM    P-R Interval 140 ms    QRS Duration 84 ms    Q-T Interval 376 ms    QTC Calculation (Bezet) 419 ms    Calculated P Axis 42 degrees    Calculated R Axis 17 degrees    Calculated T Axis 4 degrees    Diagnosis       Normal sinus rhythm  Normal ECG  No previous ECGs available  Confirmed by Umberto Lloyd MD, [facility] ([id number]) on 11/17/2021 9:59:30 PM     CBC WITH AUTOMATED DIFF    Collection Time: 11/17/21  2:53 PM   Result Value Ref Range    WBC 8.8 3.6 - 11.0 K/uL    RBC 4.11 3.80 - 5.20 M/uL    HGB 10.5 (L) 11.5 - 16.0 g/dL    HCT 34.8 (L) 35.0 - 47.0 %    MCV 84.7 80.0 - 99.0 FL    MCH 25.5 (L) 26.0 - 34.0 PG    MCHC 30.2 30.0 - 36.5 g/dL    RDW 17.3 (H) 11.5 - 14.5 %    PLATELET 056 441 - 153 K/uL    MPV 10.5 8.9 - 12.9 FL    NRBC 0.0 0.0  WBC    ABSOLUTE NRBC 0.00 0.00 - 0.01 K/uL    NEUTROPHILS 55 32 - 75 %    LYMPHOCYTES 33 12 - 49 %    MONOCYTES 9 5 - 13 %    EOSINOPHILS 2 0 - 7 %    BASOPHILS 1 0 - 1 %    IMMATURE GRANULOCYTES 0 0 - 0.5 %    ABS. NEUTROPHILS 4.9 1.8 - 8.0 K/UL    ABS. LYMPHOCYTES 2.9 0.8 - 3.5 K/UL    ABS. MONOCYTES 0.8 0.0 - 1.0 K/UL    ABS. EOSINOPHILS 0.2 0.0 - 0.4 K/UL    ABS. BASOPHILS 0.1 0.0 - 0.1 K/UL    ABS. IMM. GRANS. 0.0 0.00 - 0.04 K/UL    DF AUTOMATED     METABOLIC PANEL, COMPREHENSIVE    Collection Time: 11/17/21  2:53 PM   Result Value Ref Range    Sodium 142 136 - 145 mmol/L    Potassium 6.3 (H) 3.5 - 5.1 mmol/L    Chloride 113 (H) 97 - 108 mmol/L    CO2 28 21 - 32 mmol/L    Anion gap 1 (L) 5 - 15 mmol/L    Glucose 94 65 - 100 mg/dL    BUN 16 6 - 20 mg/dL    Creatinine 0.86 0.55 - 1.02 mg/dL    BUN/Creatinine ratio 19 12 - 20      GFR est AA >60 >60 ml/min/1.73m2    GFR est non-AA >60 >60 ml/min/1.73m2    Calcium 8.7 8.5 - 10.1 mg/dL    Bilirubin, total 0.4 0.2 - 1.0 mg/dL    AST (SGOT) 27 15 - 37 U/L    ALT (SGPT) 13 12 - 78 U/L    Alk.  phosphatase 69 45 - 117 U/L    Protein, total 6.8 6.4 - 8.2 g/dL    Albumin 2.4 (L) 3.5 - 5.0 g/dL    Globulin 4.4 (H) 2.0 - 4.0 g/dL    A-G Ratio 0.5 (L) 1.1 - 2.2     TYPE & SCREEN Collection Time: 11/17/21  2:53 PM   Result Value Ref Range    Crossmatch Expiration 11/20/2021,2359     ABO/Rh(D) Bee Garcia Positive     Antibody screen Negative     Unit number [id number]     Blood component type [facility]     Unit division 00     Status of unit [address] to transfuse     Crossmatch result Compatible     Unit number [id number]     Blood component type [facility]     Unit division 00     Status of unit [address] to transfuse     Crossmatch result Compatible    TROPONIN-HIGH SENSITIVITY    Collection Time: 11/17/21  2:53 PM   Result Value Ref Range    Troponin-High Sensitivity 7 0 - 51 ng/L   PROTHROMBIN TIME + INR    Collection Time: 11/17/21  2:53 PM   Result Value Ref Range    Prothrombin time 13.2 11.9 - 14.7 sec    INR 1.0 0.9 - 1.1     CBC WITH AUTOMATED DIFF    Collection Time: 11/17/21  4:37 PM   Result Value Ref Range    WBC 8.9 3.6 - 11.0 K/uL    RBC 4.03 3.80 - 5.20 M/uL    HGB 10.3 (L) 11.5 - 16.0 g/dL    HCT 34.0 (L) 35.0 - 47.0 %    MCV 84.4 80.0 - 99.0 FL    MCH 25.6 (L) 26.0 - 34.0 PG    MCHC 30.3 30.0 - 36.5 g/dL    RDW 17.3 (H) 11.5 - 14.5 %    PLATELET 318 947 - 698 K/uL    NRBC 0.0 0.0  WBC    ABSOLUTE NRBC 0.00 0.00 - 0.01 K/uL    NEUTROPHILS 55 32 - 75 %    LYMPHOCYTES 33 12 - 49 %    MONOCYTES 9 5 - 13 %    EOSINOPHILS 2 0 - 7 %    BASOPHILS 1 0 - 1 %    IMMATURE GRANULOCYTES 0 0 - 0.5 %    ABS. NEUTROPHILS 4.9 1.8 - 8.0 K/UL    ABS. LYMPHOCYTES 3.0 0.8 - 3.5 K/UL    ABS. MONOCYTES 0.8 0.0 - 1.0 K/UL    ABS. EOSINOPHILS 0.2 0.0 - 0.4 K/UL    ABS. BASOPHILS 0.1 0.0 - 0.1 K/UL    ABS. IMM.  GRANS. 0.0 0.00 - 0.04 K/UL    DF AUTOMATED     POTASSIUM    Collection Time: 11/17/21  4:37 PM   Result Value Ref Range    Potassium 4.2 3.5 - 5.1 mmol/L   HEMOGLOBIN    Collection Time: 11/17/21  6:09 PM   Result Value Ref Range    HGB 9.3 (L) 11.5 - 16.0 g/dL   EMERGENT RELEASE OF UNCROSSMATCHED RED CELLS    Collection Time: 11/17/21  6:30 PM Result Value Ref Range    Crossmatch Expiration 11/20/2021,2359     ABO/Rh(D) O Positive     Antibody screen Negative    HGB & HCT    Collection Time: 11/17/21 10:15 PM   Result Value Ref Range    HGB 11.4 (L) 11.5 - 16.0 g/dL    HCT 36.8 35.0 - 47.0 %   MRSA SCREEN - PCR (NASAL)    Collection Time: 11/18/21  1:30 AM   Result Value Ref Range    MRSA by PCR, Nasal Not Detected Not Detected     METABOLIC PANEL, COMPREHENSIVE    Collection Time: 11/18/21  3:00 AM   Result Value Ref Range    Sodium 145 136 - 145 mmol/L    Potassium 3.9 3.5 - 5.1 mmol/L    Chloride 115 (H) 97 - 108 mmol/L    CO2 26 21 - 32 mmol/L    Anion gap 4 (L) 5 - 15 mmol/L    Glucose 115 (H) 65 - 100 mg/dL    BUN 11 6 - 20 mg/dL    Creatinine 0.62 0.55 - 1.02 mg/dL    BUN/Creatinine ratio 18 12 - 20      GFR est AA >60 >60 ml/min/1.73m2    GFR est non-AA >60 >60 ml/min/1.73m2    Calcium 7.2 (L) 8.5 - 10.1 mg/dL    Bilirubin, total 0.2 0.2 - 1.0 mg/dL    AST (SGOT) 11 (L) 15 - 37 U/L    ALT (SGPT) 7 (L) 12 - 78 U/L    Alk.  phosphatase 59 45 - 117 U/L    Protein, total 5.7 (L) 6.4 - 8.2 g/dL    Albumin 2.3 (L) 3.5 - 5.0 g/dL    Globulin 3.4 2.0 - 4.0 g/dL    A-G Ratio 0.7 (L) 1.1 - 2.2     POTASSIUM    Collection Time: 11/18/21  3:00 AM   Result Value Ref Range    Potassium 3.7 3.5 - 5.1 mmol/L   LACTIC ACID    Collection Time: 11/18/21  3:00 AM   Result Value Ref Range    Lactic acid 1.2 0.4 - 2.0 mmol/L   MAGNESIUM    Collection Time: 11/18/21  3:00 AM   Result Value Ref Range    Magnesium 1.8 1.6 - 2.4 mg/dL   CBC WITH AUTOMATED DIFF    Collection Time: 11/18/21  3:00 AM   Result Value Ref Range    WBC 9.2 3.6 - 11.0 K/uL    RBC 4.11 3.80 - 5.20 M/uL    HGB 10.9 (L) 11.5 - 16.0 g/dL    HCT 35.2 35.0 - 47.0 %    MCV 85.6 80.0 - 99.0 FL    MCH 26.5 26.0 - 34.0 PG    MCHC 31.0 30.0 - 36.5 g/dL    RDW 17.0 (H) 11.5 - 14.5 %    PLATELET 298 046 - 730 K/uL    MPV 10.5 8.9 - 12.9 FL    NRBC 0.0 0.0  WBC    ABSOLUTE NRBC 0.00 0.00 - 0.01 K/uL NEUTROPHILS 70 32 - 75 %    LYMPHOCYTES 21 12 - 49 %    MONOCYTES 8 5 - 13 %    EOSINOPHILS 1 0 - 7 %    BASOPHILS 0 0 - 1 %    IMMATURE GRANULOCYTES 0 0 - 0.5 %    ABS. NEUTROPHILS 6.3 1.8 - 8.0 K/UL    ABS. LYMPHOCYTES 1.9 0.8 - 3.5 K/UL    ABS. MONOCYTES 0.8 0.0 - 1.0 K/UL    ABS. EOSINOPHILS 0.1 0.0 - 0.4 K/UL    ABS. BASOPHILS 0.0 0.0 - 0.1 K/UL    ABS. IMM. GRANS. 0.0 0.00 - 0.04 K/UL    DF AUTOMATED     PROTHROMBIN TIME + INR    Collection Time: 11/18/21  3:00 AM   Result Value Ref Range    Prothrombin time 13.6 11.9 - 14.7 sec    INR 1.1 0.9 - 1.1     PTT    Collection Time: 11/18/21  3:00 AM   Result Value Ref Range    aPTT 29.2 21.2 - 34.1 sec    aPTT, therapeutic range   82 - 109 sec        CTA ABD PELV W WO CONT   Final Result   1. Enhancing linear structure in the perianal region which is not seen on the   delayed images. This could represent a prominent vessel/external hemorrhoid or   may represent active bleeding. . There is adjacent gas and fluid and possibility   of a perianal abscess cannot be excluded. 2. Distention of the rectum with fecal material concerning for impaction. 3. Subtle low-density focus in the pancreatic head concerning for a mass,   recommend follow-up. 4. Other findings as described. US PELV NON OBS   Final Result   Limited study with no acute findings.       XR CHEST PORT   Final Result         Assessment:     Hospital Problems  Never Reviewed          Codes Class Noted POA    Vaginal bleeding ICD-10-CM: N93.9  ICD-9-CM: 623.8  11/17/2021 Unknown          rectal bleeding   Acute anemia status post transfusion  On anticoagulation before,    Plan:   Continue current ICU monitoring  Monitor hemoglobin closely,   hold anticoagulation   Flexible endoscopy is scheduled today for evaluation of rectum,  Indication risks discussed with patient's  power of   Signed By: Gretel Hall MD     November 18, 2021         Thank you for allowing me to participate in this patients care  Cc Referring Physician   Unknown, Provider, MD

## 2021-11-19 LAB
BASOPHILS # BLD: 0 K/UL (ref 0–0.1)
BASOPHILS # BLD: 0.1 K/UL (ref 0–0.1)
BASOPHILS NFR BLD: 0 % (ref 0–1)
BASOPHILS NFR BLD: 1 % (ref 0–1)
BASOPHILS NFR BLD: 1 % (ref 0–1)
DIFFERENTIAL METHOD BLD: ABNORMAL
EOSINOPHIL # BLD: 0 K/UL (ref 0–0.4)
EOSINOPHIL # BLD: 0.1 K/UL (ref 0–0.4)
EOSINOPHIL # BLD: 0.2 K/UL (ref 0–0.4)
EOSINOPHIL NFR BLD: 1 % (ref 0–7)
EOSINOPHIL NFR BLD: 1 % (ref 0–7)
EOSINOPHIL NFR BLD: 3 % (ref 0–7)
ERYTHROCYTE [DISTWIDTH] IN BLOOD BY AUTOMATED COUNT: 16.8 % (ref 11.5–14.5)
ERYTHROCYTE [DISTWIDTH] IN BLOOD BY AUTOMATED COUNT: 16.8 % (ref 11.5–14.5)
ERYTHROCYTE [DISTWIDTH] IN BLOOD BY AUTOMATED COUNT: 17 % (ref 11.5–14.5)
ERYTHROCYTE [DISTWIDTH] IN BLOOD BY AUTOMATED COUNT: 17.1 % (ref 11.5–14.5)
ERYTHROCYTE [DISTWIDTH] IN BLOOD BY AUTOMATED COUNT: 17.1 % (ref 11.5–14.5)
GLUCOSE BLD STRIP.AUTO-MCNC: 73 MG/DL (ref 65–117)
GLUCOSE BLD STRIP.AUTO-MCNC: 82 MG/DL (ref 65–117)
HCT VFR BLD AUTO: 22.3 % (ref 35–47)
HCT VFR BLD AUTO: 23.3 % (ref 35–47)
HCT VFR BLD AUTO: 23.5 % (ref 35–47)
HCT VFR BLD AUTO: 24.5 % (ref 35–47)
HCT VFR BLD AUTO: 24.9 % (ref 35–47)
HGB BLD-MCNC: 6.9 G/DL (ref 11.5–16)
HGB BLD-MCNC: 7.3 G/DL (ref 11.5–16)
HGB BLD-MCNC: 7.3 G/DL (ref 11.5–16)
HGB BLD-MCNC: 7.7 G/DL (ref 11.5–16)
HGB BLD-MCNC: 7.9 G/DL (ref 11.5–16)
IMM GRANULOCYTES # BLD AUTO: 0 K/UL (ref 0–0.04)
IMM GRANULOCYTES NFR BLD AUTO: 0 % (ref 0–0.5)
IMM GRANULOCYTES NFR BLD AUTO: 1 % (ref 0–0.5)
IMM GRANULOCYTES NFR BLD AUTO: 1 % (ref 0–0.5)
LYMPHOCYTES # BLD: 2.1 K/UL (ref 0.8–3.5)
LYMPHOCYTES # BLD: 2.4 K/UL (ref 0.8–3.5)
LYMPHOCYTES # BLD: 2.5 K/UL (ref 0.8–3.5)
LYMPHOCYTES # BLD: 2.8 K/UL (ref 0.8–3.5)
LYMPHOCYTES # BLD: 2.9 K/UL (ref 0.8–3.5)
LYMPHOCYTES NFR BLD: 27 % (ref 12–49)
LYMPHOCYTES NFR BLD: 30 % (ref 12–49)
LYMPHOCYTES NFR BLD: 32 % (ref 12–49)
LYMPHOCYTES NFR BLD: 35 % (ref 12–49)
LYMPHOCYTES NFR BLD: 39 % (ref 12–49)
MCH RBC QN AUTO: 27.3 PG (ref 26–34)
MCH RBC QN AUTO: 27.7 PG (ref 26–34)
MCH RBC QN AUTO: 27.8 PG (ref 26–34)
MCH RBC QN AUTO: 27.9 PG (ref 26–34)
MCH RBC QN AUTO: 27.9 PG (ref 26–34)
MCHC RBC AUTO-ENTMCNC: 30.9 G/DL (ref 30–36.5)
MCHC RBC AUTO-ENTMCNC: 31.1 G/DL (ref 30–36.5)
MCHC RBC AUTO-ENTMCNC: 31.3 G/DL (ref 30–36.5)
MCHC RBC AUTO-ENTMCNC: 31.4 G/DL (ref 30–36.5)
MCHC RBC AUTO-ENTMCNC: 31.7 G/DL (ref 30–36.5)
MCV RBC AUTO: 88 FL (ref 80–99)
MCV RBC AUTO: 88.1 FL (ref 80–99)
MCV RBC AUTO: 88.4 FL (ref 80–99)
MCV RBC AUTO: 88.9 FL (ref 80–99)
MCV RBC AUTO: 89 FL (ref 80–99)
MONOCYTES # BLD: 0.7 K/UL (ref 0–1)
MONOCYTES # BLD: 0.8 K/UL (ref 0–1)
MONOCYTES # BLD: 0.8 K/UL (ref 0–1)
MONOCYTES # BLD: 1 K/UL (ref 0–1)
MONOCYTES # BLD: 1 K/UL (ref 0–1)
MONOCYTES NFR BLD: 10 % (ref 5–13)
MONOCYTES NFR BLD: 11 % (ref 5–13)
MONOCYTES NFR BLD: 12 % (ref 5–13)
MONOCYTES NFR BLD: 12 % (ref 5–13)
MONOCYTES NFR BLD: 9 % (ref 5–13)
NEUTS SEG # BLD: 3.4 K/UL (ref 1.8–8)
NEUTS SEG # BLD: 3.6 K/UL (ref 1.8–8)
NEUTS SEG # BLD: 4.2 K/UL (ref 1.8–8)
NEUTS SEG # BLD: 4.7 K/UL (ref 1.8–8)
NEUTS SEG # BLD: 5.5 K/UL (ref 1.8–8)
NEUTS SEG NFR BLD: 49 % (ref 32–75)
NEUTS SEG NFR BLD: 51 % (ref 32–75)
NEUTS SEG NFR BLD: 52 % (ref 32–75)
NEUTS SEG NFR BLD: 56 % (ref 32–75)
NEUTS SEG NFR BLD: 60 % (ref 32–75)
NRBC # BLD: 0 K/UL (ref 0–0.01)
NRBC BLD-RTO: 0 PER 100 WBC
PERFORMED BY, TECHID: NORMAL
PERFORMED BY, TECHID: NORMAL
PLATELET # BLD AUTO: 149 K/UL (ref 150–400)
PLATELET # BLD AUTO: 159 K/UL (ref 150–400)
PLATELET # BLD AUTO: 160 K/UL (ref 150–400)
PLATELET # BLD AUTO: 166 K/UL (ref 150–400)
PLATELET # BLD AUTO: 166 K/UL (ref 150–400)
PMV BLD AUTO: 10.6 FL (ref 8.9–12.9)
PMV BLD AUTO: 10.7 FL (ref 8.9–12.9)
PMV BLD AUTO: 10.8 FL (ref 8.9–12.9)
PMV BLD AUTO: 11 FL (ref 8.9–12.9)
PMV BLD AUTO: 11.2 FL (ref 8.9–12.9)
RBC # BLD AUTO: 2.53 M/UL (ref 3.8–5.2)
RBC # BLD AUTO: 2.62 M/UL (ref 3.8–5.2)
RBC # BLD AUTO: 2.64 M/UL (ref 3.8–5.2)
RBC # BLD AUTO: 2.77 M/UL (ref 3.8–5.2)
RBC # BLD AUTO: 2.83 M/UL (ref 3.8–5.2)
WBC # BLD AUTO: 6.6 K/UL (ref 3.6–11)
WBC # BLD AUTO: 7.4 K/UL (ref 3.6–11)
WBC # BLD AUTO: 8 K/UL (ref 3.6–11)
WBC # BLD AUTO: 8.3 K/UL (ref 3.6–11)
WBC # BLD AUTO: 8.9 K/UL (ref 3.6–11)

## 2021-11-19 PROCEDURE — 74011000250 HC RX REV CODE- 250: Performed by: INTERNAL MEDICINE

## 2021-11-19 PROCEDURE — 65270000029 HC RM PRIVATE

## 2021-11-19 PROCEDURE — 74011250637 HC RX REV CODE- 250/637: Performed by: INTERNAL MEDICINE

## 2021-11-19 PROCEDURE — P9047 ALBUMIN (HUMAN), 25%, 50ML: HCPCS | Performed by: INTERNAL MEDICINE

## 2021-11-19 PROCEDURE — 85025 COMPLETE CBC W/AUTO DIFF WBC: CPT

## 2021-11-19 PROCEDURE — 74011250636 HC RX REV CODE- 250/636: Performed by: HOSPITALIST

## 2021-11-19 PROCEDURE — 74011000258 HC RX REV CODE- 258: Performed by: INTERNAL MEDICINE

## 2021-11-19 PROCEDURE — 36415 COLL VENOUS BLD VENIPUNCTURE: CPT

## 2021-11-19 PROCEDURE — 74011250636 HC RX REV CODE- 250/636: Performed by: INTERNAL MEDICINE

## 2021-11-19 PROCEDURE — P9047 ALBUMIN (HUMAN), 25%, 50ML: HCPCS | Performed by: HOSPITALIST

## 2021-11-19 PROCEDURE — 92610 EVALUATE SWALLOWING FUNCTION: CPT

## 2021-11-19 PROCEDURE — 82962 GLUCOSE BLOOD TEST: CPT

## 2021-11-19 RX ORDER — ALBUMIN HUMAN 250 G/1000ML
25 SOLUTION INTRAVENOUS EVERY 6 HOURS
Status: COMPLETED | OUTPATIENT
Start: 2021-11-19 | End: 2021-11-20

## 2021-11-19 RX ORDER — ALBUMIN HUMAN 250 G/1000ML
25 SOLUTION INTRAVENOUS EVERY 6 HOURS
Status: DISCONTINUED | OUTPATIENT
Start: 2021-11-19 | End: 2021-11-19 | Stop reason: SDUPTHER

## 2021-11-19 RX ORDER — FACIAL-BODY WIPES
10 EACH TOPICAL DAILY PRN
Status: DISCONTINUED | OUTPATIENT
Start: 2021-11-19 | End: 2021-11-22 | Stop reason: HOSPADM

## 2021-11-19 RX ORDER — AMOXICILLIN 250 MG
1 CAPSULE ORAL 2 TIMES DAILY
Status: DISCONTINUED | OUTPATIENT
Start: 2021-11-19 | End: 2021-11-22 | Stop reason: HOSPADM

## 2021-11-19 RX ADMIN — ALBUMIN (HUMAN) 25 G: 0.25 INJECTION, SOLUTION INTRAVENOUS at 10:12

## 2021-11-19 RX ADMIN — SODIUM CHLORIDE 60 MG: 9 INJECTION, SOLUTION INTRAVENOUS at 15:39

## 2021-11-19 RX ADMIN — DONEPEZIL HYDROCHLORIDE 10 MG: 5 TABLET, FILM COATED ORAL at 21:24

## 2021-11-19 RX ADMIN — Medication 10 ML: at 21:26

## 2021-11-19 RX ADMIN — MEMANTINE HYDROCHLORIDE 10 MG: 10 TABLET ORAL at 15:39

## 2021-11-19 RX ADMIN — ALBUMIN (HUMAN) 25 G: 0.25 INJECTION, SOLUTION INTRAVENOUS at 21:25

## 2021-11-19 RX ADMIN — Medication 10 ML: at 01:04

## 2021-11-19 RX ADMIN — Medication 10 ML: at 05:46

## 2021-11-19 RX ADMIN — Medication 10 ML: at 15:39

## 2021-11-19 RX ADMIN — SODIUM CHLORIDE 60 MG: 9 INJECTION, SOLUTION INTRAVENOUS at 05:46

## 2021-11-19 RX ADMIN — Medication 10 ML: at 01:05

## 2021-11-19 RX ADMIN — TRAZODONE HYDROCHLORIDE 50 MG: 50 TABLET ORAL at 21:25

## 2021-11-19 RX ADMIN — DOCUSATE SODIUM 50 MG AND SENNOSIDES 8.6 MG 1 TABLET: 8.6; 5 TABLET, FILM COATED ORAL at 15:38

## 2021-11-19 RX ADMIN — SIMVASTATIN 20 MG: 10 TABLET, FILM COATED ORAL at 21:24

## 2021-11-19 RX ADMIN — FOLIC ACID 1 MG: 1 TABLET ORAL at 15:38

## 2021-11-19 RX ADMIN — Medication 10 ML: at 05:45

## 2021-11-19 RX ADMIN — SODIUM CHLORIDE 60 MG: 9 INJECTION, SOLUTION INTRAVENOUS at 01:08

## 2021-11-19 RX ADMIN — Medication 10 ML: at 15:40

## 2021-11-19 RX ADMIN — DOCUSATE SODIUM 50 MG AND SENNOSIDES 8.6 MG 1 TABLET: 8.6; 5 TABLET, FILM COATED ORAL at 21:24

## 2021-11-19 RX ADMIN — SODIUM CHLORIDE 75 ML/HR: 4.5 INJECTION, SOLUTION INTRAVENOUS at 03:22

## 2021-11-19 NOTE — PROGRESS NOTES
1400 entered room as endo team was cleaning up after procedure. Dr. Marli Rucker became concerned that pt was hypovolemic and requested fluids and a pressor. Maps at that time was 30s 35/15 and repeat was 74/48  Dr. Zac Morales notified. Orders received. Albumin and 500 bolus administered pt trendelenberged feet elevated. bp improved and vasopressors were not started. Repeated hbg with instructions to transfuse if less than 8.0. Pt responsive but drowsy.

## 2021-11-19 NOTE — PROGRESS NOTES
Pt arrived to 5W via bed to room 567. Pt non-verbal, but comprehend communication well using head movement to answer yes and no questions. Skin assessment:  -Dry discolor skin to bilateral ankles and feet      -+4 edema to RLE  -small laceration to abdominal fold and inner rt thigh  -pt obese  Speech therapy in to assess pt. Pt was able to assist in singing a few words of happy Birthday or lip sing. No visual distress noted at this time and pt denies any pain at this time.

## 2021-11-19 NOTE — PROGRESS NOTES
Progress Note    Patient: Jordy Kendrick MRN: [id number]  SSN: [id number]    YOB: 1955  Age: 74 y.o.   Sex: female      Admit Date: 11/17/2021    LOS: 2 days     Subjective:   Examined, no bleeding, no pain,    Past Medical History:   Diagnosis Date    Aphasia as late effect of cerebrovascular accident     Cerebral aneurysm     Coronary artery disease     [facility]     Dementia ([facility]     Epilepsy ([facility]     GERD (gastroesophageal reflux disease)     Hyperlipidemia     Hypertension     Ischemic optic neuropathy of left eye     Right hemiparesis ([facility]         Current Facility-Administered Medications:     senna-docusate (PERICOLACE) 8.6-50 mg per tablet 1 Tablet, 1 Tablet, Oral, BID, Antonio Urena DO, 1 Tablet at 11/19/21 1538    bisacodyL (DULCOLAX) suppository 10 mg, 10 mg, Rectal, DAILY PRN, Antonio Urena DO    albumin human 25% (BUMINATE) solution 25 g, 25 g, IntraVENous, Q6H, Tanmay Alvarez MD    [Held by provider] divalproex (DEPAKOTE SPRINKLE) capsule 125 mg, 125 mg, Oral, BID, Raf Mac MD    folic acid (FOLVITE) tablet 1 mg, 1 mg, Oral, DAILY, Raf Mac MD, 1 mg at 11/19/21 1538    donepeziL (ARICEPT) tablet 10 mg, 10 mg, Oral, QHS, Raf Mac MD    memantine [facility]) tablet 10 mg, 10 mg, Oral, DAILY, Raf Mac MD, 10 mg at 11/19/21 1539    simvastatin (ZOCOR) tablet 20 mg, 20 mg, Oral, QHS, Raf White MD    traZODone (DESYREL) tablet 50 mg, 50 mg, Oral, QHS, Raf Mac MD    0.45% sodium chloride infusion, 75 mL/hr, IntraVENous, CONTINUOUS, Antonio Urena DO, Last Rate: 75 mL/hr at 11/19/21 0322, 75 mL/hr at 11/19/21 0322    valproate (DEPACON) 60 mg in 0.9% sodium chloride 50 mL IVPB, 60 mg, IntraVENous, Q6H, Antonio Urena DO, Last Rate: 50 mL/hr at 11/19/21 1539, 60 mg at 11/19/21 1539    NOREPINephrine (LEVOPHED) 8 mg in 5% dextrose 250mL (32 mcg/mL) infusion, 0.5-16 mcg/min, IntraVENous, TITRATE, Kun DO Cliff, Held at 11/18/21 1600    0.9% sodium chloride infusion 250 mL, 250 mL, IntraVENous, PRN, Antonio Urena DO    sodium chloride (NS) flush 5-40 mL, 5-40 mL, IntraVENous, Q8H, Giana Guerra MD, 10 mL at 11/19/21 1540    sodium chloride (NS) flush 5-40 mL, 5-40 mL, IntraVENous, PRN, Giana Guerra MD    sodium chloride (NS) flush 5-40 mL, 5-40 mL, IntraVENous, Q8H, Tommy Wei MD, 10 mL at 11/19/21 1539    sodium chloride (NS) flush 5-40 mL, 5-40 mL, IntraVENous, PRN, Raf Wei MD    acetaminophen (TYLENOL) tablet 650 mg, 650 mg, Oral, Q6H PRN **OR** acetaminophen (TYLENOL) suppository 650 mg, 650 mg, Rectal, Q6H PRN, Raf Wei MD    polyethylene glycol [facility]) packet 17 g, 17 g, Oral, DAILY PRN, Raf Wei MD    ondansetron (ZOFRAN ODT) tablet 4 mg, 4 mg, Oral, Q8H PRN **OR** ondansetron (ZOFRAN) injection 4 mg, 4 mg, IntraVENous, Q6H PRN, Larry Ruiz MD    Objective:     Vitals:    11/19/21 0600 11/19/21 0700 11/19/21 0800 11/19/21 1200   BP: 108/66 (!) 129/91 115/67 128/67   Pulse:   65 71   Resp: 20 17 16 16   Temp:  98 °F (36.7 °C)  98.1 °F (36.7 °C)   SpO2: 100% 100% 100% 100%   Weight:       Height:            Intake and Output:  Current Shift: No intake/output data recorded. Last three shifts: 11/17 1901 - 11/19 0700  In: 1086.7 [I.V.:451.3]  Out: 1475 [Urine:1475]    Physical Exam:   Physical Exam  Constitutional:       Appearance: She is ill-appearing. HENT:      Mouth/Throat:      Mouth: Mucous membranes are dry. Eyes:      General: No scleral icterus. Cardiovascular:      Rate and Rhythm: Normal rate. Musculoskeletal:         General: Normal range of motion. Cervical back: Neck supple. Skin:     General: Skin is warm.           Lab/Data Review:  Recent Results (from the past 24 hour(s))   CBC WITH AUTOMATED DIFF    Collection Time: 11/18/21 11:15 PM   Result Value Ref Range    WBC 8.9 3.6 - 11.0 K/uL    RBC 2.83 (L) 3.80 - 5.20 M/uL    HGB 7.9 (L) 11.5 - 16.0 g/dL    HCT 24.9 (L) 35.0 - 47.0 %    MCV 88.0 80.0 - 99.0 FL    MCH 27.9 26.0 - 34.0 PG    MCHC 31.7 30.0 - 36.5 g/dL    RDW 16.8 (H) 11.5 - 14.5 %    PLATELET 380 (L) 403 - 400 K/uL    MPV 10.8 8.9 - 12.9 FL    NRBC 0.0 0.0  WBC    ABSOLUTE NRBC 0.00 0.00 - 0.01 K/uL    NEUTROPHILS 60 32 - 75 %    LYMPHOCYTES 27 12 - 49 %    MONOCYTES 11 5 - 13 %    EOSINOPHILS 1 0 - 7 %    BASOPHILS 0 0 - 1 %    IMMATURE GRANULOCYTES 1 (H) 0 - 0.5 %    ABS. NEUTROPHILS 5.5 1.8 - 8.0 K/UL    ABS. LYMPHOCYTES 2.4 0.8 - 3.5 K/UL    ABS. MONOCYTES 1.0 0.0 - 1.0 K/UL    ABS. EOSINOPHILS 0.0 0.0 - 0.4 K/UL    ABS. BASOPHILS 0.0 0.0 - 0.1 K/UL    ABS. IMM. GRANS. 0.0 0.00 - 0.04 K/UL    DF AUTOMATED     GLUCOSE, POC    Collection Time: 11/18/21 11:19 PM   Result Value Ref Range    Glucose (POC) 94 65 - 117 mg/dL    Performed by Jensen Maharaj    CBC WITH AUTOMATED DIFF    Collection Time: 11/19/21  3:40 AM   Result Value Ref Range    WBC 8.3 3.6 - 11.0 K/uL    RBC 2.77 (L) 3.80 - 5.20 M/uL    HGB 7.7 (L) 11.5 - 16.0 g/dL    HCT 24.5 (L) 35.0 - 47.0 %    MCV 88.4 80.0 - 99.0 FL    MCH 27.8 26.0 - 34.0 PG    MCHC 31.4 30.0 - 36.5 g/dL    RDW 16.8 (H) 11.5 - 14.5 %    PLATELET 787 228 - 546 K/uL    MPV 11.0 8.9 - 12.9 FL    NRBC 0.0 0.0  WBC    ABSOLUTE NRBC 0.00 0.00 - 0.01 K/uL    NEUTROPHILS 56 32 - 75 %    LYMPHOCYTES 30 12 - 49 %    MONOCYTES 12 5 - 13 %    EOSINOPHILS 1 0 - 7 %    BASOPHILS 1 0 - 1 %    IMMATURE GRANULOCYTES 0 0 - 0.5 %    ABS. NEUTROPHILS 4.7 1.8 - 8.0 K/UL    ABS. LYMPHOCYTES 2.5 0.8 - 3.5 K/UL    ABS. MONOCYTES 1.0 0.0 - 1.0 K/UL    ABS. EOSINOPHILS 0.1 0.0 - 0.4 K/UL    ABS. BASOPHILS 0.1 0.0 - 0.1 K/UL    ABS. IMM.  GRANS. 0.0 0.00 - 0.04 K/UL    DF AUTOMATED     GLUCOSE, POC    Collection Time: 11/19/21 12:20 PM   Result Value Ref Range    Glucose (POC) 73 65 - 117 mg/dL    Performed by Leo Villarreal    CBC WITH AUTOMATED DIFF    Collection Time: 11/19/21 12:30 PM   Result Value Ref Range    WBC 7.4 3.6 - 11.0 K/uL    RBC 2.64 (L) 3.80 - 5.20 M/uL    HGB 7.3 (L) 11.5 - 16.0 g/dL    HCT 23.5 (L) 35.0 - 47.0 %    MCV 89.0 80.0 - 99.0 FL    MCH 27.7 26.0 - 34.0 PG    MCHC 31.1 30.0 - 36.5 g/dL    RDW 17.1 (H) 11.5 - 14.5 %    PLATELET 556 259 - 511 K/uL    MPV 11.2 8.9 - 12.9 FL    NRBC 0.0 0.0  WBC    ABSOLUTE NRBC 0.00 0.00 - 0.01 K/uL    NEUTROPHILS 49 32 - 75 %    LYMPHOCYTES 39 12 - 49 %    MONOCYTES 9 5 - 13 %    EOSINOPHILS 3 0 - 7 %    BASOPHILS 0 0 - 1 %    IMMATURE GRANULOCYTES 0 0 - 0.5 %    ABS. NEUTROPHILS 3.6 1.8 - 8.0 K/UL    ABS. LYMPHOCYTES 2.9 0.8 - 3.5 K/UL    ABS. MONOCYTES 0.7 0.0 - 1.0 K/UL    ABS. EOSINOPHILS 0.2 0.0 - 0.4 K/UL    ABS. BASOPHILS 0.0 0.0 - 0.1 K/UL    ABS. IMM. GRANS. 0.0 0.00 - 0.04 K/UL    DF AUTOMATED     CBC WITH AUTOMATED DIFF    Collection Time: 11/19/21  3:07 PM   Result Value Ref Range    WBC 8.0 3.6 - 11.0 K/uL    RBC 2.62 (L) 3.80 - 5.20 M/uL    HGB 7.3 (L) 11.5 - 16.0 g/dL    HCT 23.3 (L) 35.0 - 47.0 %    MCV 88.9 80.0 - 99.0 FL    MCH 27.9 26.0 - 34.0 PG    MCHC 31.3 30.0 - 36.5 g/dL    RDW 17.1 (H) 11.5 - 14.5 %    PLATELET 888 523 - 978 K/uL    MPV 10.7 8.9 - 12.9 FL    NRBC 0.0 0.0  WBC    ABSOLUTE NRBC 0.00 0.00 - 0.01 K/uL    NEUTROPHILS 51 32 - 75 %    LYMPHOCYTES 35 12 - 49 %    MONOCYTES 10 5 - 13 %    EOSINOPHILS 3 0 - 7 %    BASOPHILS 0 0 - 1 %    IMMATURE GRANULOCYTES 1 (H) 0 - 0.5 %    ABS. NEUTROPHILS 4.2 1.8 - 8.0 K/UL    ABS. LYMPHOCYTES 2.8 0.8 - 3.5 K/UL    ABS. MONOCYTES 0.8 0.0 - 1.0 K/UL    ABS. EOSINOPHILS 0.2 0.0 - 0.4 K/UL    ABS. BASOPHILS 0.0 0.0 - 0.1 K/UL    ABS. IMM. GRANS. 0.0 0.00 - 0.04 K/UL    DF AUTOMATED          CTA ABD PELV W WO CONT   Final Result   1. Enhancing linear structure in the perianal region which is not seen on the   delayed images. This could represent a prominent vessel/external hemorrhoid or   may represent active bleeding. . There is adjacent gas and fluid and possibility   of a perianal abscess cannot be excluded. 2. Distention of the rectum with fecal material concerning for impaction. 3. Subtle low-density focus in the pancreatic head concerning for a mass,   recommend follow-up. 4. Other findings as described. US PELV NON OBS   Final Result   Limited study with no acute findings.       XR CHEST PORT   Final Result           Assessment:     Active Problems:    Vaginal bleeding (11/17/2021)    Rectal bleeding, from hemorrhoids, s/p treatment with Hemoclip,  Constipation, fecal impaction,    Plan:   Continue on the monitor hemoglobin,  Clear liquids,  Stool softener,  Avoid NSAIDs, anticoagulation,    Signed By: Nancy Ramirez MD     November 19, 2021        Thank you for allowing me to participate in this patients care  Cc Referring Physician   Unknown, Provider, MD

## 2021-11-19 NOTE — PROGRESS NOTES
Pulmonology and Critical Care Progress Note    Subjective:     Chief Complaint:   Chief Complaint   Patient presents with    Anal Bleeding        Patient seen and examined in her room in the ICU this morning, no acute events overnight. Patient remains hemodynamically stable and is on room air, wakes up easily and follows commands. Patient underwent a sigmoidoscopy yesterday with GI showing bleeding hemorrhoids, status post epinephrine spray and clips with hemostasis. Patient received 1 unit of packed red blood cells last night for some low blood pressure and her hemoglobin is stable at 7.7 this morning. No further bleeding. Blood pressures are stable but are on the lower end, will start albumin 25 g IV every 6 hours x4 doses and repeat labs in the morning. We are awaiting clearance on diet today from GI but she is already planning to transfer out of the ICU today. Okay to transfer from a pulmonary standpoint. Will need speech therapy to clear her prior to starting her on a diet given her history of aphasia.         Current Facility-Administered Medications   Medication Dose Route Frequency Provider Last Rate Last Admin    albumin human 25% (BUMINATE) solution 25 g  25 g IntraVENous Q6H Antonio Urena DO        [Held by provider] divalproex (DEPAKOTE SPRINKLE) capsule 125 mg  125 mg Oral BID Raf Young MD        folic acid Candy Perez) tablet 1 mg  1 mg Oral DAILY Raf Young MD        donepeziL (ARICEPT) tablet 10 mg  10 mg Oral QHS Raf Young MD        memantine [facility]) tablet 10 mg  10 mg Oral DAILY Raf Young MD        simvastatin (ZOCOR) tablet 20 mg  20 mg Oral QHS Raf Young MD        traZODone (DESYREL) tablet 50 mg  50 mg Oral QHS Juli South MD        0.45% sodium chloride infusion  75 mL/hr IntraVENous CONTINUOUS Lindcatherinee DO Nicole 75 mL/hr at 11/19/21 0322 75 mL/hr at 11/19/21 0322    valproate (DEPACON) 60 mg in 0.9% sodium chloride 50 mL IVPB  60 mg IntraVENous Q6H Lindalee Pilon, DO 50 mL/hr at 21 0546 60 mg at 21 0546    NOREPINephrine (LEVOPHED) 8 mg in 5% dextrose 250mL (32 mcg/mL) infusion  0.5-16 mcg/min IntraVENous TITRATE Lindalee Pilon, DO   Held at 21 1600    0.9% sodium chloride infusion 250 mL  250 mL IntraVENous PRN Antonio Urena DO        sodium chloride (NS) flush 5-40 mL  5-40 mL IntraVENous Q8H Giana Guerra MD   10 mL at 21 0546    sodium chloride (NS) flush 5-40 mL  5-40 mL IntraVENous PRN Giana Guerra MD        sodium chloride (NS) flush 5-40 mL  5-40 mL IntraVENous Q8H Raf Young MD   10 mL at 21 0545    sodium chloride (NS) flush 5-40 mL  5-40 mL IntraVENous PRN Juli South MD        acetaminophen (TYLENOL) tablet 650 mg  650 mg Oral Q6H PRN Juli South MD        Or   Dexter acetaminophen (TYLENOL) suppository 650 mg  650 mg Rectal Q6H PRN Juli South MD        polyethylene glycol [facility]) packet 17 g  17 g Oral DAILY PRN Juli South MD        ondansetron [facility]) tablet 4 mg  4 mg Oral Q8H PRN Juli South MD        Or    ondansetron [facility]) injection 4 mg  4 mg IntraVENous Q6H PRN Juli South MD              No Known Allergies    Review of Systems:  A comprehensive review of systems was negative except for that written in the HPI. Objective:     Blood pressure 115/67, pulse 65, temperature 98 °F (36.7 °C), resp. rate 16, height 5' 6\" (1.676 m), weight 96.9 kg (213 lb 10 oz), SpO2 100 %. Temp (24hrs), Av.1 °F (36.7 °C), Min:97.7 °F (36.5 °C), Max:98.6 °F (37 °C)      Intake and Output:  Current Shift: No intake/output data recorded.   Last 3 Shifts:  1901 -  0700  In: 1086.7 [I.V.:451.3]  Out: 1475 [Urine:1475]    Physical Exam:   General appearance: alert, cooperative, no distress, appears stated age  Head: Normocephalic, without obvious abnormality, atraumatic  Eyes: negative  Neck: supple, symmetrical, trachea midline, no adenopathy and no JVD  Lungs: clear to auscultation bilaterally, currently on room air  Heart: regular rate and rhythm, S1, S2 normal, no murmur, click, rub or gallop  Abdomen: soft, non-tender. Bowel sounds normal. No masses,  no organomegaly  Extremities: extremities normal, atraumatic, no cyanosis or edema  Pulses: 2+ and symmetric  Skin: Skin color, texture, turgor normal. No rashes or lesions  Lymph nodes: Cervical, supraclavicular, and axillary nodes normal.  Neurologic: Patient is aphasic but is following commands and answering questions appropriately, she is alert    Additional comments:None    Lab/Data Review: All lab results for the last 24 hours reviewed. Recent Results (from the past 24 hour(s))   CBC WITH AUTOMATED DIFF    Collection Time: 11/18/21 10:25 AM   Result Value Ref Range    WBC 11.6 (H) 3.6 - 11.0 K/uL    RBC 3.51 (L) 3.80 - 5.20 M/uL    HGB 9.5 (L) 11.5 - 16.0 g/dL    HCT 30.2 (L) 35.0 - 47.0 %    MCV 86.0 80.0 - 99.0 FL    MCH 27.1 26.0 - 34.0 PG    MCHC 31.5 30.0 - 36.5 g/dL    RDW 17.0 (H) 11.5 - 14.5 %    PLATELET 396 680 - 292 K/uL    NRBC 0.0 0.0  WBC    ABSOLUTE NRBC 0.00 0.00 - 0.01 K/uL    NEUTROPHILS 65 32 - 75 %    LYMPHOCYTES 25 12 - 49 %    MONOCYTES 9 5 - 13 %    EOSINOPHILS 1 0 - 7 %    BASOPHILS 0 0 - 1 %    IMMATURE GRANULOCYTES 0 0 - 0.5 %    ABS. NEUTROPHILS 7.5 1.8 - 8.0 K/UL    ABS. LYMPHOCYTES 2.9 0.8 - 3.5 K/UL    ABS. MONOCYTES 1.0 0.0 - 1.0 K/UL    ABS. EOSINOPHILS 0.1 0.0 - 0.4 K/UL    ABS. BASOPHILS 0.1 0.0 - 0.1 K/UL    ABS. IMM.  GRANS. 0.0 0.00 - 0.04 K/UL    DF AUTOMATED     GLUCOSE, POC    Collection Time: 11/18/21  2:34 PM   Result Value Ref Range    Glucose (POC) 97 65 - 117 mg/dL    Performed by Laura Paul    CBC WITH AUTOMATED DIFF    Collection Time: 11/18/21  3:00 PM   Result Value Ref Range    WBC 16.9 (H) 3.6 - 11.0 K/uL    RBC 2.92 (L) 3.80 - 5.20 M/uL    HGB 7.9 (L) 11.5 - 16.0 g/dL    HCT 25.3 (L) 35.0 - 47.0 %    MCV 86.6 80.0 - 99.0 FL    MCH 27.1 26.0 - 34.0 PG    MCHC 31.2 30.0 - 36.5 g/dL    RDW 17.0 (H) 11.5 - 14.5 %    PLATELET 852 437 - 983 K/uL    MPV 10.7 8.9 - 12.9 FL    NRBC 0.0 0.0  WBC    ABSOLUTE NRBC 0.00 0.00 - 0.01 K/uL    NEUTROPHILS 71 32 - 75 %    LYMPHOCYTES 20 12 - 49 %    MONOCYTES 8 5 - 13 %    EOSINOPHILS 1 0 - 7 %    BASOPHILS 0 0 - 1 %    IMMATURE GRANULOCYTES 0 0 - 0.5 %    ABS. NEUTROPHILS 11.9 (H) 1.8 - 8.0 K/UL    ABS. LYMPHOCYTES 3.4 0.8 - 3.5 K/UL    ABS. MONOCYTES 1.4 (H) 0.0 - 1.0 K/UL    ABS. EOSINOPHILS 0.1 0.0 - 0.4 K/UL    ABS. BASOPHILS 0.1 0.0 - 0.1 K/UL    ABS. IMM. GRANS. 0.1 (H) 0.00 - 0.04 K/UL    DF AUTOMATED     CBC WITH AUTOMATED DIFF    Collection Time: 11/18/21 11:15 PM   Result Value Ref Range    WBC 8.9 3.6 - 11.0 K/uL    RBC 2.83 (L) 3.80 - 5.20 M/uL    HGB 7.9 (L) 11.5 - 16.0 g/dL    HCT 24.9 (L) 35.0 - 47.0 %    MCV 88.0 80.0 - 99.0 FL    MCH 27.9 26.0 - 34.0 PG    MCHC 31.7 30.0 - 36.5 g/dL    RDW 16.8 (H) 11.5 - 14.5 %    PLATELET 224 (L) 606 - 400 K/uL    MPV 10.8 8.9 - 12.9 FL    NRBC 0.0 0.0  WBC    ABSOLUTE NRBC 0.00 0.00 - 0.01 K/uL    NEUTROPHILS 60 32 - 75 %    LYMPHOCYTES 27 12 - 49 %    MONOCYTES 11 5 - 13 %    EOSINOPHILS 1 0 - 7 %    BASOPHILS 0 0 - 1 %    IMMATURE GRANULOCYTES 1 (H) 0 - 0.5 %    ABS. NEUTROPHILS 5.5 1.8 - 8.0 K/UL    ABS. LYMPHOCYTES 2.4 0.8 - 3.5 K/UL    ABS. MONOCYTES 1.0 0.0 - 1.0 K/UL    ABS. EOSINOPHILS 0.0 0.0 - 0.4 K/UL    ABS. BASOPHILS 0.0 0.0 - 0.1 K/UL    ABS. IMM.  GRANS. 0.0 0.00 - 0.04 K/UL    DF AUTOMATED     GLUCOSE, POC    Collection Time: 11/18/21 11:19 PM   Result Value Ref Range    Glucose (POC) 94 65 - 117 mg/dL    Performed by Bailey Dubose    CBC WITH AUTOMATED DIFF    Collection Time: 11/19/21  3:40 AM   Result Value Ref Range    WBC 8.3 3.6 - 11.0 K/uL    RBC 2.77 (L) 3.80 - 5.20 M/uL    HGB 7.7 (L) 11.5 - 16.0 g/dL    HCT 24.5 (L) 35.0 - 47.0 %    MCV 88.4 80.0 - 99.0 FL    MCH 27.8 26.0 - 34.0 PG    MCHC 31.4 30.0 - 36.5 g/dL    RDW 16.8 (H) 11.5 - 14.5 % PLATELET 471 680 - 436 K/uL    MPV 11.0 8.9 - 12.9 FL    NRBC 0.0 0.0  WBC    ABSOLUTE NRBC 0.00 0.00 - 0.01 K/uL    NEUTROPHILS 56 32 - 75 %    LYMPHOCYTES 30 12 - 49 %    MONOCYTES 12 5 - 13 %    EOSINOPHILS 1 0 - 7 %    BASOPHILS 1 0 - 1 %    IMMATURE GRANULOCYTES 0 0 - 0.5 %    ABS. NEUTROPHILS 4.7 1.8 - 8.0 K/UL    ABS. LYMPHOCYTES 2.5 0.8 - 3.5 K/UL    ABS. MONOCYTES 1.0 0.0 - 1.0 K/UL    ABS. EOSINOPHILS 0.1 0.0 - 0.4 K/UL    ABS. BASOPHILS 0.1 0.0 - 0.1 K/UL    ABS. IMM. GRANS. 0.0 0.00 - 0.04 K/UL    DF AUTOMATED           Chest X-Ray:   CTA ABD PELV W WO CONT   Final Result   1. Enhancing linear structure in the perianal region which is not seen on the   delayed images. This could represent a prominent vessel/external hemorrhoid or   may represent active bleeding. . There is adjacent gas and fluid and possibility   of a perianal abscess cannot be excluded. 2. Distention of the rectum with fecal material concerning for impaction. 3. Subtle low-density focus in the pancreatic head concerning for a mass,   recommend follow-up. 4. Other findings as described. US PELV NON OBS   Final Result   Limited study with no acute findings. XR CHEST PORT   Final Result          CT imaging:  CT Results  (Last 48 hours)               11/17/21 2313  CTA ABD PELV W WO CONT Final result    Impression:  1. Enhancing linear structure in the perianal region which is not seen on the   delayed images. This could represent a prominent vessel/external hemorrhoid or   may represent active bleeding. . There is adjacent gas and fluid and possibility   of a perianal abscess cannot be excluded. 2. Distention of the rectum with fecal material concerning for impaction. 3. Subtle low-density focus in the pancreatic head concerning for a mass,   recommend follow-up. 4. Other findings as described.        Narrative:  Axial images from the lung bases to the pubic symphysis were obtained prior to   and following intravenous administration of 100 mL Isovue-370. Images were   obtained during arterial, portal venous and renal excretory phases of   enhancement. Sagittal and coronal reformatted images were reviewed along with   maximum intensity projected images. All CT scans at this facility are performed   using dose reduction optimization techniques as appropriate to a performed exam   including the following:   automated exposure control, adjustments of the mA   and/or kV according to patient size, or use of iterative reconstruction   technique. INDICATION: Rectal bleeding. There are atelectatic changes at the lung bases. Heart size is enlarged. Cholecystectomy clips are seen. Noncontrasted images shows a 12 mm cyst in the   right hepatic lobe with adjacent calcification. There is an IVC filter in place. A few tiny nonobstructing left renal calcifications. GI tract shows no evidence   of obstruction. There is distention of the rectum with fecal material. Uterus   and urinary bladder are unremarkable. There is 2.2 cm transverse diameter   fat-containing umbilical hernia. Following contrast administration, small low-density lesion in the lateral   segment left hepatic lobe appears stable. Spleen and adrenal glands are   unremarkable. There is a subtle area of decreased density in the pancreatic head   measuring 16 x 18 mm. There is no pancreatic ductal dilatation. There is a   linear focus of enhancement in the midline perianal region which appears to be   outside of the expected confines of the GI tract. There are a few adjacent gas   locules. Portal venous phase images shows the area of decreased attenuation in   the pancreatic head to be slightly better visualized measuring 13 x 16 mm. Abdominal aorta exhibits no focal aneurysm or dissection. Except for a a few   small renal cysts, the kidneys enhance symmetrically. No hydronephrosis. No   retroperitoneal adenopathy. Urinary bladder is distended.  There is persistence   linear high density in the perianal region in the midline suggesting prominent   vessel or active bleeding. This does not change in distribution however. On the delayed images, the enhanced products seen on the earlier postcontrast   images is not seen. Delayed images show normal caliber collecting systems and   ureters. The area of decreased enhancement in the pancreatic head measures   approximate 14 x 20 mm and abuts the superior mesenteric vein. IVC filter with   no evidence of thrombus in the IVC or pelvic veins. Normal excretion of contrast   in the collecting systems and ureters and normal filling of the urinary   bladder. . There is a right common iliac artery stent. Bone windows and   reconstructed images show mild degenerative changes of the spine with no acute   bony findings. Maximum intensity projected images show no significant additional   vascular abnormalities. PFTs:   PFT Results  (Last 3 results in the past 10 years)    None            Assessment:     Patient is a 58-year-old -American female with a history of CVA with residual right-sided weakness and aphasia, GERD, CAD, dementia, hypertension, and hyperlipidemia who presented to the hospital with gastrointestinal bleeding. Plan:     1.)  Acute lower GI bleeding  -Presented with rectal bleeding, now status post 3 units packed red blood cells. Globin stable at 7.7 today  -Appreciate assistance from gastroenterology, status post sigmoidoscopy on 11/18/2021 showing bleeding hemorrhoids now status post epinephrine spray and clips with hemostasis.  -Was a concern for vaginal bleeding on admission however suspect that this is actually from rectal/perianal bleeding.  -Continue to monitor CBCs closely.  -Okay for transfer out of the ICU today.  -Continue to hold antiplatelet agents, may need neurology clearance in terms of when to restart antiplatelet agents.     2.)  Acute blood loss anemia  -Presented with rectal bleeding, now status post 3 units packed red blood cells. Globin stable at 7.7 today  -Appreciate assistance from gastroenterology, status post sigmoidoscopy on 11/18/2021 showing bleeding hemorrhoids now status post epinephrine spray and clips with hemostasis.  -Was a concern for vaginal bleeding on admission however suspect that this is actually from rectal/perianal bleeding.  -Continue to monitor CBCs closely.  -Okay for transfer out of the ICU today. 3.)  Constipation/impaction  -As noticed on the sigmoidoscopy and CT abdomen/pelvis, nevertheless she is having bowel movements per the nursing staff  -Once cleared for diet, would recommend senna S twice daily and rectal suppositories if necessary. Suspect that she may not be a candidate for enemas given recent hemorrhoidal bleeding and treatment.  -We will need to watch closely.     4.)  Hypertension  -Blood pressure has actually been stable/low  -We will start the patient on some scheduled albumin over the next 24 hours     5.)  History of CVA/dementia  -Continue home Namenda/Aricept and hyperlipidemia medications  -Holding home antiplatelet agents given bleeding, may need neurology to weigh in as to timing of appropriate re-initiation  -When she is able to eat, would recommend speech therapy clearance for diet        CODE STATUS: Full Code      Disposition and Family: Okay to transfer to the floor    Total time spent with patient: 30 minutes      Araceli Castaneda DO  [facility]  11/19/2021  10:26 AM

## 2021-11-19 NOTE — PROGRESS NOTES
Problem: Dysphagia (Adult)  Goal: *Acute Goals and Plan of Care (Insert Text)  Description: Speech Therapy Goals  Initiated 11/19/2021  -Patient will tolerate clear liquids diet with thin liquids without signs/symptoms of aspiration given no cues within 7 day(s). [ ] Not met  [ ]  MET   [ ] Progressing  [ ] Elnita Purchase  -Patient will demonstrate understanding of swallow safety precautions and aspiration precautions, diet recs with minimal cues within 7  day(s). [ ] Not met  [ ]  MET   [ ] Progressing  [ ] Discontinue  Outcome: Not Met   SPEECH [address] EVALUATION  Patient: Debbie Shaffer (17 y.o. female)  Date: 11/19/2021  Primary Diagnosis: Vaginal bleeding [N93.9]  Procedure(s) (LRB):  SIGMOIDOSCOPY (N/A) 1 Day Post-Op   Precautions: aspiration       ASSESSMENT :  Based on the objective data described below, the patient presents with mild-mod oropharyngeal dysphagia that appears negatively impacted by underlying GI dysfunction. Pt with hx of CVA, aphasia and presents with overt oral apraxia. Pt is essentially nonverbal but is able to obtain strain/strangled voicing with song task. Clinician spoke with GI, Dr. Nigel Mckeon, who approved Rochelle Cyrus only if pt does not have continued bleeding. Nsg reports no bleeding this date. Per hard chart review, pt on regular/thin diet at SNF. Pt is noted to have secretions pooling in oral cavity upon clinician arrival. Pt with impaired oral motor command following due to apraxia. Pt unable to swallow on command. With trials of ice and thin via cup/straw (water) only, oral phase with reduced mastication of ice chip trials, mild delay in a-p propulsion of thin liquids, pharyngeal phase with mild delay, appears WFL once initiated. Pt tolerated without overt s/s aspiration, though pt declines further trials and agrees with nausea when asked. Pt is noted to have saliva wash following trials.     Patient will benefit from skilled intervention to address the above impairments. Patients rehabilitation potential is considered to be Good     PLAN :  Recommendations and Planned Interventions: At this time, pt's oropharyngeal sw function appears appropriate for initiation of clear liquids diet with 1:1 assistance with ALL PO intake, STRICT aspiration and GERD precautions, monitor pt closely for s/s aspiration, meds as tolerated. Recommend continued SLP intervention for PO Trials of diet advancement. Concerns for ongoing GI dysfunction are present. Will request MBS if/as indicated pending pt's progress. CXR noted with lungs clear per report. Frequency/Duration: Patient will be followed by speech-language pathology daily to address goals. Discharge Recommendations: Skilled Nursing Facility     SUBJECTIVE:   Patient alert, agreeble. OBJECTIVE:     CXR Results  (Last 48 hours)                 11/17/21 1515  XR CHEST PORT Final result    Narrative:  Chest view. Comparison single view chest November 26, 2015. Lungs clear; no interstitial or alveolar pulmonary edema. Cardiac and   mediastinal structures magnified on this AP view. Similar degree thoracic aorta   ectasia. No pneumothorax or sizable pleural effusion              CT Results  (Last 48 hours)                 11/17/21 2313  CTA ABD PELV W WO CONT Final result    Impression:  1. Enhancing linear structure in the perianal region which is not seen on the   delayed images. This could represent a prominent vessel/external hemorrhoid or   may represent active bleeding. . There is adjacent gas and fluid and possibility   of a perianal abscess cannot be excluded. 2. Distention of the rectum with fecal material concerning for impaction. 3. Subtle low-density focus in the pancreatic head concerning for a mass,   recommend follow-up. 4. Other findings as described.        Narrative:  Axial images from the lung bases to the pubic symphysis were obtained prior to and following intravenous administration of 100 mL Isovue-370. Images were   obtained during arterial, portal venous and renal excretory phases of   enhancement. Sagittal and coronal reformatted images were reviewed along with   maximum intensity projected images. All CT scans at this facility are performed   using dose reduction optimization techniques as appropriate to a performed exam   including the following:   automated exposure control, adjustments of the mA   and/or kV according to patient size, or use of iterative reconstruction   technique. INDICATION: Rectal bleeding. There are atelectatic changes at the lung bases. Heart size is enlarged. Cholecystectomy clips are seen. Noncontrasted images shows a 12 mm cyst in the   right hepatic lobe with adjacent calcification. There is an IVC filter in place. A few tiny nonobstructing left renal calcifications. GI tract shows no evidence   of obstruction. There is distention of the rectum with fecal material. Uterus   and urinary bladder are unremarkable. There is 2.2 cm transverse diameter   fat-containing umbilical hernia. Following contrast administration, small low-density lesion in the lateral   segment left hepatic lobe appears stable. Spleen and adrenal glands are   unremarkable. There is a subtle area of decreased density in the pancreatic head   measuring 16 x 18 mm. There is no pancreatic ductal dilatation. There is a   linear focus of enhancement in the midline perianal region which appears to be   outside of the expected confines of the GI tract. There are a few adjacent gas   locules. Portal venous phase images shows the area of decreased attenuation in   the pancreatic head to be slightly better visualized measuring 13 x 16 mm. Abdominal aorta exhibits no focal aneurysm or dissection. Except for a a few   small renal cysts, the kidneys enhance symmetrically. No hydronephrosis. No   retroperitoneal adenopathy.  Urinary bladder is distended. There is persistence   linear high density in the perianal region in the midline suggesting prominent   vessel or active bleeding. This does not change in distribution however. On the delayed images, the enhanced products seen on the earlier postcontrast   images is not seen. Delayed images show normal caliber collecting systems and   ureters. The area of decreased enhancement in the pancreatic head measures   approximate 14 x 20 mm and abuts the superior mesenteric vein. IVC filter with   no evidence of thrombus in the IVC or pelvic veins. Normal excretion of contrast   in the collecting systems and ureters and normal filling of the urinary   bladder. . There is a right common iliac artery stent. Bone windows and   reconstructed images show mild degenerative changes of the spine with no acute   bony findings. Maximum intensity projected images show no significant additional   vascular abnormalities. Past Medical History:   Diagnosis Date    Aphasia as late effect of cerebrovascular accident     Cerebral aneurysm     Coronary artery disease     CVA (cerebral vascular accident) ([address]     Dementia ([address]     Epilepsy ([address]     GERD (gastroesophageal reflux disease)     Hyperlipidemia     Hypertension     Ischemic optic neuropathy of left eye     Right hemiparesis ([address]    History reviewed. No pertinent surgical history.   Prior Level of Function/Home Situation: SNF  Home Situation  Home Environment: Assisted living  One/Two Story Residence: Other (Comment)  Living Alone: No  Support Systems: Assisted Living  Patient Expects to be Discharged to[de-identified] Assisted living  Current DME Used/Available at Home: Other (comment)  Diet prior to admission: regular/thin  Current Diet:  NPO   Cognitive and Communication Status:  Neurologic State: Alert  Orientation Level: Unable to verbalize  Cognition: Follows commands    Pain:  Pain Scale 1: Numeric (0 - 10)  Pain Intensity 1: 0       After treatment: Patient left in no apparent distress in bed, Call bell within reach, and Nursing notified    COMMUNICATION/EDUCATION:   Patient was educated regarding purpose of SLP assessment, POC, diet recs and sw safety precautions. Patient demonstrated [address] understanding as evidenced by aphasia, inconsistent yes/no responses. The patient's plan of care including recommendations, planned interventions, and recommended diet changes were discussed with: Registered nurse. Patient/family have participated as able in goal setting and plan of care. Patient/family agree to work toward stated goals and plan of care.     Thank you for this referral.  Jazmine Weeks M.S. CCC-SLP  Time Calculation: 11 mins

## 2021-11-19 NOTE — PROGRESS NOTES
Progress Note    Patient: Veronica Bryan MRN: [id number]  SSN: [id number]    YOB: 1955  Age: 74 y.o. Sex: female      Admit Date: 11/17/2021    LOS: 2 days     Subjective:     66F, h/o CVA with Right sided hemiplegia with aphasia on prasugrel with LGIB with ABLA     S/p sigmoidscope- bleeding hemorrhoids s/p clips and epinephrine    Received 2 PRBC    Monitor. If Hb stable then plan for dc    Review of Systems:  Constitutional:  denies weight loss, no fever, no chills, no night sweats  Eye: No recent visual disturbances, no discharge, no double vision  Ear/nose/mouth/throat : No hearing disturbance, no ear pain, no nasal congestion, no sore throat, no trouble swallowing. Respiratory : No trouble breathing, no cough, no shortness of breath, no hemoptysis, no wheezing  Cardiovascular : No chest pain, no palpitation, no racing of heart, no orthopnea, no paroxysmal nocturnal dyspnea, no peripheral edema  Gastrointestinal : No nausea, no vomiting, no diarrhea, constipation, heartburn, abdominal pain  Genitourinary : No dysuria, no hematuria, no increased frequency, incontinence,  Lymphatics : No swollen glands -Neck, axillary, inguinal  Endocrine : No excessive thirst, no polyuria no cold intolerance, no heat intolerance. Immunologic : No hives, urticaria, no seasonal allergies,   Musculoskeletal : Left upper back pain.   No joint swelling, pain, effusion,  no neck pain,   Integumentary : No rash, no pruritus, no ecchymosis  Hematology : No petechiae, No easy bruising,  No tendency to bleed easy  Neurology : Denies change in mental status, no abnormal balance, no headache, no confusion, numbness, tingling,  Psychiatric : No mood swings, no anxiety, depression      Objective:     Vitals:    11/19/21 0600 11/19/21 0700 11/19/21 0800 11/19/21 1200   BP: 108/66 (!) 129/91 115/67 128/67   Pulse:   65 71   Resp: 20 17 16 16   Temp:  98 °F (36.7 °C)  98.1 °F (36.7 °C)   SpO2: 100% 100% 100% 100%   Weight:       Height:            Intake and Output:  Current Shift: No intake/output data recorded. Last three shifts: 11/17 1901 - 11/19 0700  In: 1086.7 [I.V.:451.3]  Out: 1475 [Urine:1475]      Physical Exam:      Physical Exam   Constitutional: Awake and alert, interactive, NAD  Eyes: PERRL, no injection or scleral icterus, no discharge  HEENT: NCAT, neck supple, MMM, no oropharyngeal exudates  CV: RRR, no m/r/g  Respiratory: CTAB, no r/r/w  GI: Abd soft, nondistended, nontender  : No bleeding from rectum, gross blood from vagina  MSK: FROM, no joint effusions or edema  Skin: No rashes  Neuro: Symmetric facies, aphasic, R-sided hemiplegia         Lab/Data Review:  Recent Results (from the past 24 hour(s))   CBC WITH AUTOMATED DIFF    Collection Time: 11/18/21 11:15 PM   Result Value Ref Range    WBC 8.9 3.6 - 11.0 K/uL    RBC 2.83 (L) 3.80 - 5.20 M/uL    HGB 7.9 (L) 11.5 - 16.0 g/dL    HCT 24.9 (L) 35.0 - 47.0 %    MCV 88.0 80.0 - 99.0 FL    MCH 27.9 26.0 - 34.0 PG    MCHC 31.7 30.0 - 36.5 g/dL    RDW 16.8 (H) 11.5 - 14.5 %    PLATELET 279 (L) 536 - 400 K/uL    MPV 10.8 8.9 - 12.9 FL    NRBC 0.0 0.0  WBC    ABSOLUTE NRBC 0.00 0.00 - 0.01 K/uL    NEUTROPHILS 60 32 - 75 %    LYMPHOCYTES 27 12 - 49 %    MONOCYTES 11 5 - 13 %    EOSINOPHILS 1 0 - 7 %    BASOPHILS 0 0 - 1 %    IMMATURE GRANULOCYTES 1 (H) 0 - 0.5 %    ABS. NEUTROPHILS 5.5 1.8 - 8.0 K/UL    ABS. LYMPHOCYTES 2.4 0.8 - 3.5 K/UL    ABS. MONOCYTES 1.0 0.0 - 1.0 K/UL    ABS. EOSINOPHILS 0.0 0.0 - 0.4 K/UL    ABS. BASOPHILS 0.0 0.0 - 0.1 K/UL    ABS. IMM.  GRANS. 0.0 0.00 - 0.04 K/UL    DF AUTOMATED     GLUCOSE, POC    Collection Time: 11/18/21 11:19 PM   Result Value Ref Range    Glucose (POC) 94 65 - 117 mg/dL    Performed by Guicho Gibson    CBC WITH AUTOMATED DIFF    Collection Time: 11/19/21  3:40 AM   Result Value Ref Range    WBC 8.3 3.6 - 11.0 K/uL    RBC 2.77 (L) 3.80 - 5.20 M/uL    HGB 7.7 (L) 11.5 - 16.0 g/dL    HCT 24.5 (L) 35.0 - 47.0 %    MCV 88.4 80.0 - 99.0 FL    MCH 27.8 26.0 - 34.0 PG    MCHC 31.4 30.0 - 36.5 g/dL    RDW 16.8 (H) 11.5 - 14.5 %    PLATELET 120 379 - 488 K/uL    MPV 11.0 8.9 - 12.9 FL    NRBC 0.0 0.0  WBC    ABSOLUTE NRBC 0.00 0.00 - 0.01 K/uL    NEUTROPHILS 56 32 - 75 %    LYMPHOCYTES 30 12 - 49 %    MONOCYTES 12 5 - 13 %    EOSINOPHILS 1 0 - 7 %    BASOPHILS 1 0 - 1 %    IMMATURE GRANULOCYTES 0 0 - 0.5 %    ABS. NEUTROPHILS 4.7 1.8 - 8.0 K/UL    ABS. LYMPHOCYTES 2.5 0.8 - 3.5 K/UL    ABS. MONOCYTES 1.0 0.0 - 1.0 K/UL    ABS. EOSINOPHILS 0.1 0.0 - 0.4 K/UL    ABS. BASOPHILS 0.1 0.0 - 0.1 K/UL    ABS. IMM. GRANS. 0.0 0.00 - 0.04 K/UL    DF AUTOMATED     GLUCOSE, POC    Collection Time: 11/19/21 12:20 PM   Result Value Ref Range    Glucose (POC) 73 65 - 117 mg/dL    Performed by Mariettalen Dose    CBC WITH AUTOMATED DIFF    Collection Time: 11/19/21 12:30 PM   Result Value Ref Range    WBC 7.4 3.6 - 11.0 K/uL    RBC 2.64 (L) 3.80 - 5.20 M/uL    HGB 7.3 (L) 11.5 - 16.0 g/dL    HCT 23.5 (L) 35.0 - 47.0 %    MCV 89.0 80.0 - 99.0 FL    MCH 27.7 26.0 - 34.0 PG    MCHC 31.1 30.0 - 36.5 g/dL    RDW 17.1 (H) 11.5 - 14.5 %    PLATELET 815 205 - 017 K/uL    MPV 11.2 8.9 - 12.9 FL    NRBC 0.0 0.0  WBC    ABSOLUTE NRBC 0.00 0.00 - 0.01 K/uL    NEUTROPHILS 49 32 - 75 %    LYMPHOCYTES 39 12 - 49 %    MONOCYTES 9 5 - 13 %    EOSINOPHILS 3 0 - 7 %    BASOPHILS 0 0 - 1 %    IMMATURE GRANULOCYTES 0 0 - 0.5 %    ABS. NEUTROPHILS 3.6 1.8 - 8.0 K/UL    ABS. LYMPHOCYTES 2.9 0.8 - 3.5 K/UL    ABS. MONOCYTES 0.7 0.0 - 1.0 K/UL    ABS. EOSINOPHILS 0.2 0.0 - 0.4 K/UL    ABS. BASOPHILS 0.0 0.0 - 0.1 K/UL    ABS. IMM.  GRANS. 0.0 0.00 - 0.04 K/UL    DF AUTOMATED     CBC WITH AUTOMATED DIFF    Collection Time: 11/19/21  3:07 PM   Result Value Ref Range    WBC 8.0 3.6 - 11.0 K/uL    RBC 2.62 (L) 3.80 - 5.20 M/uL    HGB 7.3 (L) 11.5 - 16.0 g/dL    HCT 23.3 (L) 35.0 - 47.0 %    MCV 88.9 80.0 - 99.0 FL    MCH 27.9 26.0 - 34.0 PG    MCHC 31.3 30.0 - 36.5 g/dL    RDW 17.1 (H) 11.5 - 14.5 %    PLATELET 229 781 - 070 K/uL    MPV 10.7 8.9 - 12.9 FL    NRBC 0.0 0.0  WBC    ABSOLUTE NRBC 0.00 0.00 - 0.01 K/uL    NEUTROPHILS 51 32 - 75 %    LYMPHOCYTES 35 12 - 49 %    MONOCYTES 10 5 - 13 %    EOSINOPHILS 3 0 - 7 %    BASOPHILS 0 0 - 1 %    IMMATURE GRANULOCYTES 1 (H) 0 - 0.5 %    ABS. NEUTROPHILS 4.2 1.8 - 8.0 K/UL    ABS. LYMPHOCYTES 2.8 0.8 - 3.5 K/UL    ABS. MONOCYTES 0.8 0.0 - 1.0 K/UL    ABS. EOSINOPHILS 0.2 0.0 - 0.4 K/UL    ABS. BASOPHILS 0.0 0.0 - 0.1 K/UL    ABS. IMM. GRANS. 0.0 0.00 - 0.04 K/UL    DF AUTOMATED           Assessment and plan:      (1) LGIB with ABLA: 2 PRBC, S/p S/p sigmoidscope- bleeding hemorrhoids s/p clips and epinephrine. Received 2 PRBC. Monitor in ICU, repeat in AM    (2) ANEMIA: s/t #1. Transfuse to keep Hb > 7 or active bleed    (3) CVA: hold prasgurel and aspirin. Hold for now. DVT ppx: SCD    DISPO: DC hh when stable in 1-2 days.  \      Signed By: Iron Bryan MD     November 19, 2021

## 2021-11-19 NOTE — PROGRESS NOTES
10: Radha Spoke w/legal guardian Ashwin Herman, ([address] SW recv'd court ordered guardianship documents from [address] Guardian is requesting patient's name be corrected in medical records. Acknowledged understanding and voiced will speak w/patient registration. Court documents will be placed on bedside chart w/hospital label. Legal Guardian (Dayne Fagan) direct contact [phone].           Tony López, ALLISON

## 2021-11-20 LAB
ALBUMIN SERPL-MCNC: 3.6 G/DL (ref 3.5–5)
ANION GAP SERPL CALC-SCNC: 5 MMOL/L (ref 5–15)
BUN SERPL-MCNC: 4 MG/DL (ref 6–20)
BUN/CREAT SERPL: 8 (ref 12–20)
CA-I BLD-MCNC: 7.9 MG/DL (ref 8.5–10.1)
CHLORIDE SERPL-SCNC: 115 MMOL/L (ref 97–108)
CO2 SERPL-SCNC: 26 MMOL/L (ref 21–32)
CREAT SERPL-MCNC: 0.52 MG/DL (ref 0.55–1.02)
ERYTHROCYTE [DISTWIDTH] IN BLOOD BY AUTOMATED COUNT: 16.2 % (ref 11.5–14.5)
GLUCOSE SERPL-MCNC: 87 MG/DL (ref 65–100)
HCT VFR BLD AUTO: 25.3 % (ref 35–47)
HGB BLD-MCNC: 8 G/DL (ref 11.5–16)
MAGNESIUM SERPL-MCNC: 2 MG/DL (ref 1.6–2.4)
MCH RBC QN AUTO: 28.2 PG (ref 26–34)
MCHC RBC AUTO-ENTMCNC: 31.6 G/DL (ref 30–36.5)
MCV RBC AUTO: 89.1 FL (ref 80–99)
NRBC # BLD: 0 K/UL (ref 0–0.01)
NRBC BLD-RTO: 0 PER 100 WBC
PHOSPHATE SERPL-MCNC: 2 MG/DL (ref 2.6–4.7)
PLATELET # BLD AUTO: 161 K/UL (ref 150–400)
PMV BLD AUTO: 10.7 FL (ref 8.9–12.9)
POTASSIUM SERPL-SCNC: 3.1 MMOL/L (ref 3.5–5.1)
RBC # BLD AUTO: 2.84 M/UL (ref 3.8–5.2)
SODIUM SERPL-SCNC: 146 MMOL/L (ref 136–145)
WBC # BLD AUTO: 7.2 K/UL (ref 3.6–11)

## 2021-11-20 PROCEDURE — 74011000258 HC RX REV CODE- 258: Performed by: INTERNAL MEDICINE

## 2021-11-20 PROCEDURE — 74011250636 HC RX REV CODE- 250/636: Performed by: PHYSICIAN ASSISTANT

## 2021-11-20 PROCEDURE — P9047 ALBUMIN (HUMAN), 25%, 50ML: HCPCS | Performed by: HOSPITALIST

## 2021-11-20 PROCEDURE — 65270000029 HC RM PRIVATE

## 2021-11-20 PROCEDURE — 74011250637 HC RX REV CODE- 250/637: Performed by: PHYSICIAN ASSISTANT

## 2021-11-20 PROCEDURE — 80048 BASIC METABOLIC PNL TOTAL CA: CPT

## 2021-11-20 PROCEDURE — 36430 TRANSFUSION BLD/BLD COMPNT: CPT

## 2021-11-20 PROCEDURE — 74011000250 HC RX REV CODE- 250: Performed by: INTERNAL MEDICINE

## 2021-11-20 PROCEDURE — 82040 ASSAY OF SERUM ALBUMIN: CPT

## 2021-11-20 PROCEDURE — P9016 RBC LEUKOCYTES REDUCED: HCPCS

## 2021-11-20 PROCEDURE — 74011250637 HC RX REV CODE- 250/637: Performed by: INTERNAL MEDICINE

## 2021-11-20 PROCEDURE — 74011250636 HC RX REV CODE- 250/636: Performed by: HOSPITALIST

## 2021-11-20 PROCEDURE — 85027 COMPLETE CBC AUTOMATED: CPT

## 2021-11-20 PROCEDURE — 83735 ASSAY OF MAGNESIUM: CPT

## 2021-11-20 PROCEDURE — 36415 COLL VENOUS BLD VENIPUNCTURE: CPT

## 2021-11-20 PROCEDURE — 84100 ASSAY OF PHOSPHORUS: CPT

## 2021-11-20 RX ORDER — HYDRALAZINE HYDROCHLORIDE 20 MG/ML
10 INJECTION INTRAMUSCULAR; INTRAVENOUS
Status: DISCONTINUED | OUTPATIENT
Start: 2021-11-20 | End: 2021-11-22 | Stop reason: HOSPADM

## 2021-11-20 RX ORDER — SODIUM CHLORIDE 9 MG/ML
250 INJECTION, SOLUTION INTRAVENOUS AS NEEDED
Status: DISCONTINUED | OUTPATIENT
Start: 2021-11-20 | End: 2021-11-22 | Stop reason: HOSPADM

## 2021-11-20 RX ORDER — DIVALPROEX SODIUM 125 MG/1
125 CAPSULE, COATED PELLETS ORAL 2 TIMES DAILY
Status: DISCONTINUED | OUTPATIENT
Start: 2021-11-20 | End: 2021-11-22 | Stop reason: HOSPADM

## 2021-11-20 RX ORDER — PANTOPRAZOLE SODIUM 40 MG/1
40 TABLET, DELAYED RELEASE ORAL
Status: DISCONTINUED | OUTPATIENT
Start: 2021-11-21 | End: 2021-11-21

## 2021-11-20 RX ORDER — POTASSIUM CHLORIDE 750 MG/1
20 TABLET, FILM COATED, EXTENDED RELEASE ORAL DAILY
Status: DISCONTINUED | OUTPATIENT
Start: 2021-11-20 | End: 2021-11-22 | Stop reason: HOSPADM

## 2021-11-20 RX ADMIN — DOCUSATE SODIUM 50 MG AND SENNOSIDES 8.6 MG 1 TABLET: 8.6; 5 TABLET, FILM COATED ORAL at 20:40

## 2021-11-20 RX ADMIN — ALBUMIN (HUMAN) 25 G: 0.25 INJECTION, SOLUTION INTRAVENOUS at 03:06

## 2021-11-20 RX ADMIN — POTASSIUM CHLORIDE 20 MEQ: 750 TABLET, FILM COATED, EXTENDED RELEASE ORAL at 10:08

## 2021-11-20 RX ADMIN — DONEPEZIL HYDROCHLORIDE 10 MG: 5 TABLET, FILM COATED ORAL at 20:40

## 2021-11-20 RX ADMIN — HYDRALAZINE HYDROCHLORIDE 10 MG: 20 INJECTION INTRAMUSCULAR; INTRAVENOUS at 17:41

## 2021-11-20 RX ADMIN — DIVALPROEX SODIUM 125 MG: 125 CAPSULE, COATED PELLETS ORAL at 20:40

## 2021-11-20 RX ADMIN — DOCUSATE SODIUM 50 MG AND SENNOSIDES 8.6 MG 1 TABLET: 8.6; 5 TABLET, FILM COATED ORAL at 09:52

## 2021-11-20 RX ADMIN — FOLIC ACID 1 MG: 1 TABLET ORAL at 09:52

## 2021-11-20 RX ADMIN — MEMANTINE HYDROCHLORIDE 10 MG: 10 TABLET ORAL at 09:52

## 2021-11-20 RX ADMIN — SODIUM CHLORIDE 75 ML/HR: 4.5 INJECTION, SOLUTION INTRAVENOUS at 06:02

## 2021-11-20 RX ADMIN — TRAZODONE HYDROCHLORIDE 50 MG: 50 TABLET ORAL at 20:40

## 2021-11-20 RX ADMIN — SIMVASTATIN 20 MG: 10 TABLET, FILM COATED ORAL at 20:40

## 2021-11-20 RX ADMIN — DIVALPROEX SODIUM 125 MG: 125 CAPSULE, COATED PELLETS ORAL at 16:27

## 2021-11-20 RX ADMIN — SODIUM CHLORIDE 60 MG: 9 INJECTION, SOLUTION INTRAVENOUS at 00:49

## 2021-11-20 RX ADMIN — SODIUM CHLORIDE 75 ML/HR: 4.5 INJECTION, SOLUTION INTRAVENOUS at 20:40

## 2021-11-20 RX ADMIN — SODIUM CHLORIDE 60 MG: 9 INJECTION, SOLUTION INTRAVENOUS at 06:02

## 2021-11-20 NOTE — PROGRESS NOTES
Hospitalist Progress Note    Subjective:   Daily Progress Note: 11/20/2021 9:44 AM    Hospital Course: Patient is a 25-year-old female with a past medical history of CVA with right-sided deficits and aphasia and on anticoagulation with prasugrel, nonverbal and resides in a nursing home that presented to the emergency room on 11/17/2021 for rectal bleeding. In the ED however it was reported to also have vaginal bleeding. She was hypotensive and transferred to the ICU on IV fluids. Patient is status post 3 units of packed red blood cells. Gynecology and GI consulted. Per gynecology will need a uterine biopsy once stabilized. GI performed a sigmoidoscope which showed bleeding hemorrhoids and she is status post clips and epinephrine. Patient's blood pressure was also low and received 4 doses of IV albumin. Speech therapy consulted due to her aphasia and diet was begun. Patient was transferred out of the ICU on 11/19/2021. Anticoagulations were held due to risk of bleeding. Subjective: Patient is awake and in no acute distress.   She is nonverbal.  She shakes her head no that she does not have pain    Current Facility-Administered Medications   Medication Dose Route Frequency    0.9% sodium chloride infusion 250 mL  250 mL IntraVENous PRN    potassium chloride SR (KLOR-CON 10) tablet 20 mEq  20 mEq Oral DAILY    senna-docusate (PERICOLACE) 8.6-50 mg per tablet 1 Tablet  1 Tablet Oral BID    bisacodyL (DULCOLAX) suppository 10 mg  10 mg Rectal DAILY PRN    [Held by provider] divalproex (DEPAKOTE SPRINKLE) capsule 125 mg  125 mg Oral BID    folic acid (FOLVITE) tablet 1 mg  1 mg Oral DAILY    donepeziL (ARICEPT) tablet 10 mg  10 mg Oral QHS    memantine (NAMENDA) tablet 10 mg  10 mg Oral DAILY    simvastatin (ZOCOR) tablet 20 mg  20 mg Oral QHS    traZODone (DESYREL) tablet 50 mg  50 mg Oral QHS    0.45% sodium chloride infusion  75 mL/hr IntraVENous CONTINUOUS    valproate (DEPACON) 60 mg in 0.9% sodium chloride 50 mL IVPB  60 mg IntraVENous Q6H    0.9% sodium chloride infusion 250 mL  250 mL IntraVENous PRN    sodium chloride (NS) flush 5-40 mL  5-40 mL IntraVENous PRN    sodium chloride (NS) flush 5-40 mL  5-40 mL IntraVENous PRN    acetaminophen (TYLENOL) tablet 650 mg  650 mg Oral Q6H PRN    Or    acetaminophen (TYLENOL) suppository 650 mg  650 mg Rectal Q6H PRN    polyethylene glycol (MIRALAX) packet 17 g  17 g Oral DAILY PRN    ondansetron (ZOFRAN ODT) tablet 4 mg  4 mg Oral Q8H PRN    Or    ondansetron (ZOFRAN) injection 4 mg  4 mg IntraVENous Q6H PRN        Review of Systems  Constitutional: No fevers, No chills, + fatigue, +Weakness  Eyes: No redness  Ears, nose, mouth, throat, and face: No nasal congestion, No sore throat, No voice change  Respiratory: No Shortness of Breath, No cough, No wheezing  Cardiovascular: No chest pain, No palpitations, No extremity edema  Gastrointestinal: No nausea, No vomiting, No diarrhea, No abdominal pain  Genitourinary: No frequency, No dysuria, No hematuria  Integument/breast: No skin lesion(s)   Neurological: No Confusion, No headaches, No dizziness      Objective:     Visit Vitals  /71   Pulse 63   Temp 97.9 °F (36.6 °C)   Resp 18   Ht 5' 6\" (1.676 m)   Wt 96.9 kg (213 lb 10 oz)   SpO2 100%   BMI 34.48 kg/m²      O2 Device: None (Room air)    Temp (24hrs), Av.9 °F (36.6 °C), Min:97.7 °F (36.5 °C), Max:98.1 °F (36.7 °C)      No intake/output data recorded.  1901 -  0700  In: 1317.4   Out: 1825 [Urine:1825]    PHYSICAL EXAM:  Constitutional: No acute distress  Skin: Extremities and face reveal no rashes. HEENT: Sclerae anicteric. Extra-occular muscles are intact. No oral ulcers. The neck is supple and no masses. Cardiovascular: Regular rate and rhythm. Respiratory:  Clear breath sounds bilaterally with no wheezes, rales, or rhonchi. GI: Abdomen nondistended, soft, and nontender. Normal active bowel sounds.   Musculoskeletal: No pitting edema of the lower legs. Neurological:  Patient is alert and oriented. Cranial nerves II-XII grossly intact,right sided weakness  Psychiatric: Mood appears appropriate       Data Review    Recent Results (from the past 24 hour(s))   GLUCOSE, POC    Collection Time: 11/19/21 12:20 PM   Result Value Ref Range    Glucose (POC) 73 65 - 117 mg/dL    Performed by Naman Dyer    CBC WITH AUTOMATED DIFF    Collection Time: 11/19/21 12:30 PM   Result Value Ref Range    WBC 7.4 3.6 - 11.0 K/uL    RBC 2.64 (L) 3.80 - 5.20 M/uL    HGB 7.3 (L) 11.5 - 16.0 g/dL    HCT 23.5 (L) 35.0 - 47.0 %    MCV 89.0 80.0 - 99.0 FL    MCH 27.7 26.0 - 34.0 PG    MCHC 31.1 30.0 - 36.5 g/dL    RDW 17.1 (H) 11.5 - 14.5 %    PLATELET 719 299 - 098 K/uL    MPV 11.2 8.9 - 12.9 FL    NRBC 0.0 0.0  WBC    ABSOLUTE NRBC 0.00 0.00 - 0.01 K/uL    NEUTROPHILS 49 32 - 75 %    LYMPHOCYTES 39 12 - 49 %    MONOCYTES 9 5 - 13 %    EOSINOPHILS 3 0 - 7 %    BASOPHILS 0 0 - 1 %    IMMATURE GRANULOCYTES 0 0 - 0.5 %    ABS. NEUTROPHILS 3.6 1.8 - 8.0 K/UL    ABS. LYMPHOCYTES 2.9 0.8 - 3.5 K/UL    ABS. MONOCYTES 0.7 0.0 - 1.0 K/UL    ABS. EOSINOPHILS 0.2 0.0 - 0.4 K/UL    ABS. BASOPHILS 0.0 0.0 - 0.1 K/UL    ABS. IMM. GRANS. 0.0 0.00 - 0.04 K/UL    DF AUTOMATED     CBC WITH AUTOMATED DIFF    Collection Time: 11/19/21  3:07 PM   Result Value Ref Range    WBC 8.0 3.6 - 11.0 K/uL    RBC 2.62 (L) 3.80 - 5.20 M/uL    HGB 7.3 (L) 11.5 - 16.0 g/dL    HCT 23.3 (L) 35.0 - 47.0 %    MCV 88.9 80.0 - 99.0 FL    MCH 27.9 26.0 - 34.0 PG    MCHC 31.3 30.0 - 36.5 g/dL    RDW 17.1 (H) 11.5 - 14.5 %    PLATELET 933 798 - 041 K/uL    MPV 10.7 8.9 - 12.9 FL    NRBC 0.0 0.0  WBC    ABSOLUTE NRBC 0.00 0.00 - 0.01 K/uL    NEUTROPHILS 51 32 - 75 %    LYMPHOCYTES 35 12 - 49 %    MONOCYTES 10 5 - 13 %    EOSINOPHILS 3 0 - 7 %    BASOPHILS 0 0 - 1 %    IMMATURE GRANULOCYTES 1 (H) 0 - 0.5 %    ABS. NEUTROPHILS 4.2 1.8 - 8.0 K/UL    ABS.  LYMPHOCYTES 2.8 0.8 - 3.5 K/UL ABS. MONOCYTES 0.8 0.0 - 1.0 K/UL    ABS. EOSINOPHILS 0.2 0.0 - 0.4 K/UL    ABS. BASOPHILS 0.0 0.0 - 0.1 K/UL    ABS. IMM. GRANS. 0.0 0.00 - 0.04 K/UL    DF AUTOMATED     GLUCOSE, POC    Collection Time: 11/19/21  8:24 PM   Result Value Ref Range    Glucose (POC) 82 65 - 117 mg/dL    Performed by Louann Bello    CBC WITH AUTOMATED DIFF    Collection Time: 11/19/21 11:26 PM   Result Value Ref Range    WBC 6.6 3.6 - 11.0 K/uL    RBC 2.53 (L) 3.80 - 5.20 M/uL    HGB 6.9 (L) 11.5 - 16.0 g/dL    HCT 22.3 (L) 35.0 - 47.0 %    MCV 88.1 80.0 - 99.0 FL    MCH 27.3 26.0 - 34.0 PG    MCHC 30.9 30.0 - 36.5 g/dL    RDW 17.0 (H) 11.5 - 14.5 %    PLATELET 747 429 - 445 K/uL    MPV 10.6 8.9 - 12.9 FL    NRBC 0.0 0.0  WBC    ABSOLUTE NRBC 0.00 0.00 - 0.01 K/uL    NEUTROPHILS 52 32 - 75 %    LYMPHOCYTES 32 12 - 49 %    MONOCYTES 12 5 - 13 %    EOSINOPHILS 3 0 - 7 %    BASOPHILS 1 0 - 1 %    IMMATURE GRANULOCYTES 0 0 - 0.5 %    ABS. NEUTROPHILS 3.4 1.8 - 8.0 K/UL    ABS. LYMPHOCYTES 2.1 0.8 - 3.5 K/UL    ABS. MONOCYTES 0.8 0.0 - 1.0 K/UL    ABS. EOSINOPHILS 0.2 0.0 - 0.4 K/UL    ABS. BASOPHILS 0.0 0.0 - 0.1 K/UL    ABS. IMM.  GRANS. 0.0 0.00 - 0.04 K/UL    DF AUTOMATED     METABOLIC PANEL, BASIC    Collection Time: 11/20/21  5:43 AM   Result Value Ref Range    Sodium 146 (H) 136 - 145 mmol/L    Potassium 3.1 (L) 3.5 - 5.1 mmol/L    Chloride 115 (H) 97 - 108 mmol/L    CO2 26 21 - 32 mmol/L    Anion gap 5 5 - 15 mmol/L    Glucose 87 65 - 100 mg/dL    BUN 4 (L) 6 - 20 mg/dL    Creatinine 0.52 (L) 0.55 - 1.02 mg/dL    BUN/Creatinine ratio 8 (L) 12 - 20      GFR est AA >60 >60 ml/min/1.73m2    GFR est non-AA >60 >60 ml/min/1.73m2    Calcium 7.9 (L) 8.5 - 10.1 mg/dL   MAGNESIUM    Collection Time: 11/20/21  5:43 AM   Result Value Ref Range    Magnesium 2.0 1.6 - 2.4 mg/dL   PHOSPHORUS    Collection Time: 11/20/21  5:43 AM   Result Value Ref Range    Phosphorus 2.0 (L) 2.6 - 4.7 mg/dL   ALBUMIN    Collection Time: 11/20/21  5:43 AM Result Value Ref Range    Albumin 3.6 3.5 - 5.0 g/dL   CBC W/O DIFF    Collection Time: 11/20/21  5:43 AM   Result Value Ref Range    WBC 7.2 3.6 - 11.0 K/uL    RBC 2.84 (L) 3.80 - 5.20 M/uL    HGB 8.0 (L) 11.5 - 16.0 g/dL    HCT 25.3 (L) 35.0 - 47.0 %    MCV 89.1 80.0 - 99.0 FL    MCH 28.2 26.0 - 34.0 PG    MCHC 31.6 30.0 - 36.5 g/dL    RDW 16.2 (H) 11.5 - 14.5 %    PLATELET 321 486 - 563 K/uL    MPV 10.7 8.9 - 12.9 FL    NRBC 0.0 0.0  WBC    ABSOLUTE NRBC 0.00 0.00 - 0.01 K/uL       Assessment:   1. Acute lower GI bleed  2. Vaginal bleeding  3. Acute on chronic anemia secondary #1 and 2  4. Constipation/impaction  5. Hypertension  8. History of CVA/dementia    Plan:    1. Patient went for a sigmoidoscopy on 11/18/2021. Showed bleeding hemorrhoids and now status post epinephrine spray and clips with hemostasis. GI consulted. Start Protonix. 2.  There is some concern that there may have been vaginal bleeding on admission. Gynecology consulted. Will eventually need biopsy however most likely will be done as an outpatient  3. Hemoglobin is stable. She is status post 3 units of packed red blood cells. Holding anticoagulation due to risk outweighs the benefit  4. Patient is now having bowel movements. Continue with stool softeners. Tolerating oral diet  5. Blood pressure is stable. Continue to monitor per unit protocol. She received IV albumin due to hypotension. Holding blood pressure medications at this time  6. Holding aspirin due to GI bleed. Patient is on statin. Continue with Aricept and namenda      Dispo: 24-48 hours pending clearance from consulting docs and stabilization of hemoglobin. CODE STATUS full     DVT prophylaxis: SCD  Ulcer prophylaxis: Orotonixs    Care Plan discussed with: Patient/Family, Nurse and     Total time spent with patient: 34 minutes.

## 2021-11-20 NOTE — PROGRESS NOTES
Pulmonology and Critical Care Progress Note  Patient is a 67-year-old female with a past medical history of CVA with right-sided deficits and aphasia and on anticoagulation with prasugrel, nonverbal and resides in a nursing home that presented to the emergency room on 11/17/2021 for rectal bleeding. In the ED however it was reported to also have vaginal bleeding. She was hypotensive and transferred to the ICU on IV fluids. Subjective:          Patient seen and examined, got transferred to the floor. Patient remains hemodynamically stable and is on room air, wakes up easily and follows commands. Patient underwent a sigmoidoscopy yesterday with GI showing bleeding hemorrhoids, status post epinephrine spray and clips with hemostasis. Patient received 1 unit of packed red blood cells other night for some low blood pressure and her hemoglobin is stable at 7.3 this morning. Will need speech therapy to clear her prior to starting her on a diet given her history of aphasia.         Current Facility-Administered Medications   Medication Dose Route Frequency Provider Last Rate Last Admin    0.9% sodium chloride infusion 250 mL  250 mL IntraVENous PRN Berny Chicas, NP        potassium chloride SR (KLOR-CON 10) tablet 20 mEq  20 mEq Oral DAILY Lexie Mccoy PA-C   20 mEq at 11/20/21 1008    [START ON 11/21/2021] pantoprazole (PROTONIX) tablet 40 mg  40 mg Oral ACB Lexie Mccoy PA-C        senna-docusate (PERICOLACE) 8.6-50 mg per tablet 1 Tablet  1 Tablet Oral BID Rita Levels, DO   1 Tablet at 11/20/21 4440    bisacodyL (DULCOLAX) suppository 10 mg  10 mg Rectal DAILY PRN Rita Levels, DO        [Held by provider] divalproex (DEPAKOTE SPRINKLE) capsule 125 mg  125 mg Oral BID Raf Owuus MD        folic acid Uvaldo Arauz) tablet 1 mg  1 mg Oral DAILY Dalton Sexton MD   1 mg at 11/20/21 9244    donepeziL (ARICEPT) tablet 10 mg  10 mg Oral QHS Dalton Sexton MD   10 mg at 11/19/21 1764    memantine (NAMENDA) tablet 10 mg  10 mg Oral DAILY Raf Jackson MD   10 mg at 21 97    simvastatin (ZOCOR) tablet 20 mg  20 mg Oral QHS Rfa Jackson MD   20 mg at 21    traZODone (DESYREL) tablet 50 mg  50 mg Oral QHS Raf Jackson MD   50 mg at 21    0.45% sodium chloride infusion  75 mL/hr IntraVENous CONTINUOUS Selina Bears, DO 75 mL/hr at 21 0602 75 mL/hr at 21 06    valproate (DEPACON) 60 mg in 0.9% sodium chloride 50 mL IVPB  60 mg IntraVENous Q6H Antonio Urena DO 50 mL/hr at 21 06 60 mg at 21 06    0.9% sodium chloride infusion 250 mL  250 mL IntraVENous PRN Antonio Urena DO        sodium chloride (NS) flush 5-40 mL  5-40 mL IntraVENous PRN Larry Rome MD        sodium chloride (NS) flush 5-40 mL  5-40 mL IntraVENous PRN Zulay Prescott MD        acetaminophen (TYLENOL) tablet 650 mg  650 mg Oral Q6H PRN Zulay Prescott MD        Or   [facility] acetaminophen (TYLENOL) suppository 650 mg  650 mg Rectal Q6H PRN Zulay Prescott MD        polyethylene glycol [facility]) packet 17 g  17 g Oral DAILY PRN Zulay Prescott MD        ondansetron [facility] ODT) tablet 4 mg  4 mg Oral Q8H PRN Zulay Prescott MD        Or    ondansetron [facility]) injection 4 mg  4 mg IntraVENous Q6H PRN Zulay Prescott MD              No Known Allergies    Review of Systems:  A comprehensive review of systems was negative except for that written in the HPI. Objective:     Blood pressure (!) 163/79, pulse 80, temperature 98.6 °F (37 °C), resp. rate 18, height 5' 6\" (1.676 m), weight 96.9 kg (213 lb 10 oz), SpO2 100 %. Temp (24hrs), Av °F (36.7 °C), Min:97.7 °F (36.5 °C), Max:98.6 °F (37 °C)      Intake and Output:  Current Shift: No intake/output data recorded. Last 3 Shifts: 1 -  0700  In: 1317.4   Out: 1825 [Urine:1825]    Physical Exam:   General appearance: alert, cooperative, no distress, appears stated age.   Patient is mostly nonverbal but expresses herself by nodding her head yes or no  Head: Normocephalic, without obvious abnormality, atraumatic  Eyes: negative  Neck: supple, symmetrical, trachea midline, no adenopathy and no JVD  Lungs: clear to auscultation bilaterally, currently on room air  Heart: regular rate and rhythm, S1, S2 normal, no murmur, click, rub or gallop  Abdomen: soft, non-tender. Bowel sounds normal. No masses,  no organomegaly  Extremities: extremities normal, atraumatic, no cyanosis or edema. Right upper extremity in sling  Pulses: 2+ and symmetric  Skin: Skin color, texture, turgor normal. No rashes or lesions  Lymph nodes: Cervical, supraclavicular, and axillary nodes normal.  Neurologic: Patient is aphasic but is following commands and answering questions appropriately, she is alert    Additional comments:None    Lab/Data Review: All lab results for the last 24 hours reviewed. Recent Results (from the past 24 hour(s))   GLUCOSE, POC    Collection Time: 11/19/21 12:20 PM   Result Value Ref Range    Glucose (POC) 73 65 - 117 mg/dL    Performed by Noelle Osler    CBC WITH AUTOMATED DIFF    Collection Time: 11/19/21 12:30 PM   Result Value Ref Range    WBC 7.4 3.6 - 11.0 K/uL    RBC 2.64 (L) 3.80 - 5.20 M/uL    HGB 7.3 (L) 11.5 - 16.0 g/dL    HCT 23.5 (L) 35.0 - 47.0 %    MCV 89.0 80.0 - 99.0 FL    MCH 27.7 26.0 - 34.0 PG    MCHC 31.1 30.0 - 36.5 g/dL    RDW 17.1 (H) 11.5 - 14.5 %    PLATELET 891 761 - 397 K/uL    MPV 11.2 8.9 - 12.9 FL    NRBC 0.0 0.0  WBC    ABSOLUTE NRBC 0.00 0.00 - 0.01 K/uL    NEUTROPHILS 49 32 - 75 %    LYMPHOCYTES 39 12 - 49 %    MONOCYTES 9 5 - 13 %    EOSINOPHILS 3 0 - 7 %    BASOPHILS 0 0 - 1 %    IMMATURE GRANULOCYTES 0 0 - 0.5 %    ABS. NEUTROPHILS 3.6 1.8 - 8.0 K/UL    ABS. LYMPHOCYTES 2.9 0.8 - 3.5 K/UL    ABS. MONOCYTES 0.7 0.0 - 1.0 K/UL    ABS. EOSINOPHILS 0.2 0.0 - 0.4 K/UL    ABS. BASOPHILS 0.0 0.0 - 0.1 K/UL    ABS. IMM.  GRANS. 0.0 0.00 - 0.04 K/UL    DF AUTOMATED     CBC WITH AUTOMATED DIFF    Collection Time: 11/19/21  3:07 PM   Result Value Ref Range    WBC 8.0 3.6 - 11.0 K/uL    RBC 2.62 (L) 3.80 - 5.20 M/uL    HGB 7.3 (L) 11.5 - 16.0 g/dL    HCT 23.3 (L) 35.0 - 47.0 %    MCV 88.9 80.0 - 99.0 FL    MCH 27.9 26.0 - 34.0 PG    MCHC 31.3 30.0 - 36.5 g/dL    RDW 17.1 (H) 11.5 - 14.5 %    PLATELET 052 600 - 202 K/uL    MPV 10.7 8.9 - 12.9 FL    NRBC 0.0 0.0  WBC    ABSOLUTE NRBC 0.00 0.00 - 0.01 K/uL    NEUTROPHILS 51 32 - 75 %    LYMPHOCYTES 35 12 - 49 %    MONOCYTES 10 5 - 13 %    EOSINOPHILS 3 0 - 7 %    BASOPHILS 0 0 - 1 %    IMMATURE GRANULOCYTES 1 (H) 0 - 0.5 %    ABS. NEUTROPHILS 4.2 1.8 - 8.0 K/UL    ABS. LYMPHOCYTES 2.8 0.8 - 3.5 K/UL    ABS. MONOCYTES 0.8 0.0 - 1.0 K/UL    ABS. EOSINOPHILS 0.2 0.0 - 0.4 K/UL    ABS. BASOPHILS 0.0 0.0 - 0.1 K/UL    ABS. IMM. GRANS. 0.0 0.00 - 0.04 K/UL    DF AUTOMATED     GLUCOSE, POC    Collection Time: 11/19/21  8:24 PM   Result Value Ref Range    Glucose (POC) 82 65 - 117 mg/dL    Performed by Lisa Ramos    CBC WITH AUTOMATED DIFF    Collection Time: 11/19/21 11:26 PM   Result Value Ref Range    WBC 6.6 3.6 - 11.0 K/uL    RBC 2.53 (L) 3.80 - 5.20 M/uL    HGB 6.9 (L) 11.5 - 16.0 g/dL    HCT 22.3 (L) 35.0 - 47.0 %    MCV 88.1 80.0 - 99.0 FL    MCH 27.3 26.0 - 34.0 PG    MCHC 30.9 30.0 - 36.5 g/dL    RDW 17.0 (H) 11.5 - 14.5 %    PLATELET 109 596 - 188 K/uL    MPV 10.6 8.9 - 12.9 FL    NRBC 0.0 0.0  WBC    ABSOLUTE NRBC 0.00 0.00 - 0.01 K/uL    NEUTROPHILS 52 32 - 75 %    LYMPHOCYTES 32 12 - 49 %    MONOCYTES 12 5 - 13 %    EOSINOPHILS 3 0 - 7 %    BASOPHILS 1 0 - 1 %    IMMATURE GRANULOCYTES 0 0 - 0.5 %    ABS. NEUTROPHILS 3.4 1.8 - 8.0 K/UL    ABS. LYMPHOCYTES 2.1 0.8 - 3.5 K/UL    ABS. MONOCYTES 0.8 0.0 - 1.0 K/UL    ABS. EOSINOPHILS 0.2 0.0 - 0.4 K/UL    ABS. BASOPHILS 0.0 0.0 - 0.1 K/UL    ABS. IMM.  GRANS. 0.0 0.00 - 0.04 K/UL    DF AUTOMATED     METABOLIC PANEL, BASIC    Collection Time: 11/20/21  5:43 AM   Result Value Ref Range Sodium 146 (H) 136 - 145 mmol/L    Potassium 3.1 (L) 3.5 - 5.1 mmol/L    Chloride 115 (H) 97 - 108 mmol/L    CO2 26 21 - 32 mmol/L    Anion gap 5 5 - 15 mmol/L    Glucose 87 65 - 100 mg/dL    BUN 4 (L) 6 - 20 mg/dL    Creatinine 0.52 (L) 0.55 - 1.02 mg/dL    BUN/Creatinine ratio 8 (L) 12 - 20      GFR est AA >60 >60 ml/min/1.73m2    GFR est non-AA >60 >60 ml/min/1.73m2    Calcium 7.9 (L) 8.5 - 10.1 mg/dL   MAGNESIUM    Collection Time: 11/20/21  5:43 AM   Result Value Ref Range    Magnesium 2.0 1.6 - 2.4 mg/dL   PHOSPHORUS    Collection Time: 11/20/21  5:43 AM   Result Value Ref Range    Phosphorus 2.0 (L) 2.6 - 4.7 mg/dL   ALBUMIN    Collection Time: 11/20/21  5:43 AM   Result Value Ref Range    Albumin 3.6 3.5 - 5.0 g/dL   CBC W/O DIFF    Collection Time: 11/20/21  5:43 AM   Result Value Ref Range    WBC 7.2 3.6 - 11.0 K/uL    RBC 2.84 (L) 3.80 - 5.20 M/uL    HGB 8.0 (L) 11.5 - 16.0 g/dL    HCT 25.3 (L) 35.0 - 47.0 %    MCV 89.1 80.0 - 99.0 FL    MCH 28.2 26.0 - 34.0 PG    MCHC 31.6 30.0 - 36.5 g/dL    RDW 16.2 (H) 11.5 - 14.5 %    PLATELET 748 912 - 498 K/uL    MPV 10.7 8.9 - 12.9 FL    NRBC 0.0 0.0  WBC    ABSOLUTE NRBC 0.00 0.00 - 0.01 K/uL         Chest X-Ray:   CTA ABD PELV W WO CONT   Final Result   1. Enhancing linear structure in the perianal region which is not seen on the   delayed images. This could represent a prominent vessel/external hemorrhoid or   may represent active bleeding. . There is adjacent gas and fluid and possibility   of a perianal abscess cannot be excluded. 2. Distention of the rectum with fecal material concerning for impaction. 3. Subtle low-density focus in the pancreatic head concerning for a mass,   recommend follow-up. 4. Other findings as described. US PELV NON OBS   Final Result   Limited study with no acute findings.       XR CHEST PORT   Final Result          CT imaging:  CT Results  (Last 48 hours)    None          PFTs:   PFT Results  (Last 3 results in the past 10 years)    None            Assessment:     Patient is a 40-year-old -American female with a history of CVA with residual right-sided weakness and aphasia, GERD, CAD, dementia, hypertension, and hyperlipidemia who presented to the hospital with gastrointestinal bleeding. Plan:     1.)  Acute lower GI bleeding  -Presented with rectal bleeding, now status post 3 units packed red blood cells. Hemoglobin stable at 7.3 today  -Appreciate assistance from gastroenterology, status post sigmoidoscopy on 11/18/2021 showing bleeding hemorrhoids now status post epinephrine spray and clips with hemostasis.  -Was a concern for vaginal bleeding on admission however suspect that this is actually from rectal/perianal bleeding.  -Continue to monitor CBCs closely.  -Patient got transferred to the floor.  -Continue to hold antiplatelet agents, may need neurology clearance in terms of when to restart antiplatelet agents. 2.)  Acute blood loss anemia  -Presented with rectal bleeding, now status post 3 units packed red blood cells. Globin stable at 7.3 today  -Appreciate assistance from gastroenterology, status post sigmoidoscopy on 11/18/2021 showing bleeding hemorrhoids now status post epinephrine spray and clips with hemostasis.  -Was a concern for vaginal bleeding on admission however suspect that this is actually from rectal/perianal bleeding.  -Continue to monitor CBCs closely. 3.)  Constipation/impaction  -As noticed on the sigmoidoscopy and CT abdomen/pelvis, nevertheless she is having bowel movements per the nursing staff  -Once cleared for diet, would recommend senna S twice daily and rectal suppositories if necessary. Suspect that she may not be a candidate for enemas given recent hemorrhoidal bleeding and treatment.  -We will need to watch closely.     4.)  Hypertension  -Blood pressure has actually been stable/low  -We will start the patient on some scheduled albumin over the next 24 hours     5.) History of CVA/dementia  -Continue home Namenda/Aricept and hyperlipidemia medications  -Holding home antiplatelet agents given bleeding, may need neurology to weigh in as to timing of appropriate re-initiation  -When she is able to eat, would recommend speech therapy clearance for diet        CODE STATUS: Full Code      Disposition and Family: Okay to transfer to the floor    Total time spent with patient: 30 minutes      Polo Zepeda MD  [facility]  11/20/2021

## 2021-11-21 VITALS
DIASTOLIC BLOOD PRESSURE: 74 MMHG | SYSTOLIC BLOOD PRESSURE: 123 MMHG | HEART RATE: 90 BPM | OXYGEN SATURATION: 97 % | TEMPERATURE: 98.4 F | BODY MASS INDEX: 34.33 KG/M2 | HEIGHT: 66 IN | RESPIRATION RATE: 18 BRPM | WEIGHT: 213.63 LBS

## 2021-11-21 LAB
ABO + RH BLD: NORMAL
ANION GAP SERPL CALC-SCNC: 8 MMOL/L (ref 5–15)
BASOPHILS # BLD: 0 K/UL (ref 0–0.1)
BASOPHILS NFR BLD: 1 % (ref 0–1)
BLD PROD TYP BPU: NORMAL
BLOOD GROUP ANTIBODIES SERPL: NEGATIVE
BPU ID: NORMAL
BUN SERPL-MCNC: 4 MG/DL (ref 6–20)
BUN/CREAT SERPL: 6 (ref 12–20)
CA-I BLD-MCNC: 8.5 MG/DL (ref 8.5–10.1)
CHLORIDE SERPL-SCNC: 113 MMOL/L (ref 97–108)
CO2 SERPL-SCNC: 25 MMOL/L (ref 21–32)
CREAT SERPL-MCNC: 0.62 MG/DL (ref 0.55–1.02)
CROSSMATCH RESULT,%XM: NORMAL
DIFFERENTIAL METHOD BLD: ABNORMAL
EOSINOPHIL # BLD: 0.1 K/UL (ref 0–0.4)
EOSINOPHIL NFR BLD: 1 % (ref 0–7)
ERYTHROCYTE [DISTWIDTH] IN BLOOD BY AUTOMATED COUNT: 16.3 % (ref 11.5–14.5)
GLUCOSE SERPL-MCNC: 84 MG/DL (ref 65–100)
HCT VFR BLD AUTO: 30.9 % (ref 35–47)
HGB BLD-MCNC: 10 G/DL (ref 11.5–16)
IMM GRANULOCYTES # BLD AUTO: 0.1 K/UL (ref 0–0.04)
IMM GRANULOCYTES NFR BLD AUTO: 1 % (ref 0–0.5)
LYMPHOCYTES # BLD: 2.1 K/UL (ref 0.8–3.5)
LYMPHOCYTES NFR BLD: 25 % (ref 12–49)
MAGNESIUM SERPL-MCNC: 1.8 MG/DL (ref 1.6–2.4)
MCH RBC QN AUTO: 28.1 PG (ref 26–34)
MCHC RBC AUTO-ENTMCNC: 32.4 G/DL (ref 30–36.5)
MCV RBC AUTO: 86.8 FL (ref 80–99)
MONOCYTES # BLD: 0.7 K/UL (ref 0–1)
MONOCYTES NFR BLD: 9 % (ref 5–13)
NEUTS SEG # BLD: 5.4 K/UL (ref 1.8–8)
NEUTS SEG NFR BLD: 63 % (ref 32–75)
NRBC # BLD: 0 K/UL (ref 0–0.01)
NRBC BLD-RTO: 0 PER 100 WBC
PHOSPHATE SERPL-MCNC: 2.3 MG/DL (ref 2.6–4.7)
PLATELET # BLD AUTO: 110 K/UL (ref 150–400)
POTASSIUM SERPL-SCNC: 3.3 MMOL/L (ref 3.5–5.1)
RBC # BLD AUTO: 3.56 M/UL (ref 3.8–5.2)
SODIUM SERPL-SCNC: 146 MMOL/L (ref 136–145)
SPECIMEN EXP DATE BLD: NORMAL
STATUS OF UNIT,%ST: NORMAL
TRANSFUSION STATUS PATIENT QL: NORMAL
UNIT DIVISION, %UDIV: 0
WBC # BLD AUTO: 8.4 K/UL (ref 3.6–11)

## 2021-11-21 PROCEDURE — 80048 BASIC METABOLIC PNL TOTAL CA: CPT

## 2021-11-21 PROCEDURE — 74011250637 HC RX REV CODE- 250/637: Performed by: INTERNAL MEDICINE

## 2021-11-21 PROCEDURE — 74011250637 HC RX REV CODE- 250/637: Performed by: PHYSICIAN ASSISTANT

## 2021-11-21 PROCEDURE — 84100 ASSAY OF PHOSPHORUS: CPT

## 2021-11-21 PROCEDURE — 83735 ASSAY OF MAGNESIUM: CPT

## 2021-11-21 PROCEDURE — 74011000250 HC RX REV CODE- 250: Performed by: INTERNAL MEDICINE

## 2021-11-21 PROCEDURE — 36415 COLL VENOUS BLD VENIPUNCTURE: CPT

## 2021-11-21 PROCEDURE — 85025 COMPLETE CBC W/AUTO DIFF WBC: CPT

## 2021-11-21 RX ORDER — PANTOPRAZOLE SODIUM 40 MG/1
40 GRANULE, DELAYED RELEASE ORAL
Status: DISCONTINUED | OUTPATIENT
Start: 2021-11-21 | End: 2021-11-22 | Stop reason: HOSPADM

## 2021-11-21 RX ORDER — PANTOPRAZOLE SODIUM 40 MG/1
40 GRANULE, DELAYED RELEASE ORAL
Qty: 30 EACH | Refills: 0 | Status: SHIPPED | OUTPATIENT
Start: 2021-11-22

## 2021-11-21 RX ADMIN — POTASSIUM CHLORIDE 20 MEQ: 750 TABLET, FILM COATED, EXTENDED RELEASE ORAL at 08:13

## 2021-11-21 RX ADMIN — SIMVASTATIN 20 MG: 10 TABLET, FILM COATED ORAL at 20:21

## 2021-11-21 RX ADMIN — TRAZODONE HYDROCHLORIDE 50 MG: 50 TABLET ORAL at 20:21

## 2021-11-21 RX ADMIN — DONEPEZIL HYDROCHLORIDE 10 MG: 5 TABLET, FILM COATED ORAL at 20:21

## 2021-11-21 RX ADMIN — DOCUSATE SODIUM 50 MG AND SENNOSIDES 8.6 MG 1 TABLET: 8.6; 5 TABLET, FILM COATED ORAL at 08:14

## 2021-11-21 RX ADMIN — PANTOPRAZOLE SODIUM 40 MG: 40 GRANULE, DELAYED RELEASE ORAL at 08:13

## 2021-11-21 RX ADMIN — ACETAMINOPHEN 650 MG: 325 TABLET ORAL at 16:47

## 2021-11-21 RX ADMIN — MEMANTINE HYDROCHLORIDE 10 MG: 10 TABLET ORAL at 08:14

## 2021-11-21 RX ADMIN — DOCUSATE SODIUM 50 MG AND SENNOSIDES 8.6 MG 1 TABLET: 8.6; 5 TABLET, FILM COATED ORAL at 20:21

## 2021-11-21 RX ADMIN — FOLIC ACID 1 MG: 1 TABLET ORAL at 08:14

## 2021-11-21 RX ADMIN — SODIUM CHLORIDE 75 ML/HR: 4.5 INJECTION, SOLUTION INTRAVENOUS at 07:49

## 2021-11-21 RX ADMIN — DIVALPROEX SODIUM 125 MG: 125 CAPSULE, COATED PELLETS ORAL at 08:14

## 2021-11-21 RX ADMIN — DIVALPROEX SODIUM 125 MG: 125 CAPSULE, COATED PELLETS ORAL at 20:21

## 2021-11-21 NOTE — PROGRESS NOTES
Report called to [id number] [facility]. Report given to [facility] HUMBERTO WHITING. Phone number provided if needed for questions. Receiving facility aware of transportation time. No questions at this time.

## 2021-11-21 NOTE — PROGRESS NOTES
DC order noted. Chart reviewed. Spoke with the pts Alexandr Trevizo [email or web address] [phone] who stated that the pt will return to her residence at [facility] [email or web address]. VM left for admit coordinator [email or web address] [phone] with request for call back. Spoke with Rep at [facility]@ [phone]. They are not yet aware of admission and are therefore unable to provide bed assignment.     CM to update after return call by [facility]

## 2021-11-21 NOTE — PROGRESS NOTES
Discharge order noted. GI MD cleared patient for clear liquids only per previous note. Per , no GI MD on call this date. Per D/C note, patient to follow up with GI in ~1 week. Recommend SLP continue to follow. Continue clear liquids and trial additional consistencies as approved by GI.

## 2021-11-21 NOTE — DISCHARGE SUMMARY
Hospitalist Discharge Summary     Patient ID:  Pau Loomis  [id number]  74 y.o.  1955  11/17/2021    PCP on record: Unknown, Provider, MD    Admit date: 11/17/2021  Discharge date and time: 11/21/2021    DISCHARGE DIAGNOSIS:    Acute lower GI bleed/hemorrhoids/vaginal bleeding/acute on chronic anemia/constipation/impaction/hypertension/history of CVA/dementia    CONSULTATIONS:  IP CONSULT TO PULMONOLOGY  IP CONSULT TO OB GYN  IP CONSULT TO OB GYN  IP CONSULT TO GASTROENTEROLOGY    Excerpted HPI from H&P of Raf Arevalo MD:  82E with a past medical history significant for CVA w/R-sided deficits and aphasia, on anticoagulation with prasugrel. Nonverbal and resides in a nursing home.     11/17/21 presents to hospital from [address] [place] [address] with what was initially thought to be rectal bleeding. During the ED course, determined to be vaginal bleeding. Stable hemoglobin during the ED course and plan to admit to hospital for stabilization.     Later became more hypotensive, managed with fluid boluses, and more profusely bleeding and status changed to ICU.    ______________________________________________________________________  DISCHARGE SUMMARY/HOSPITAL COURSE:  for full details see H&P, daily progress notes, labs, consult notes.      Patient was subsequently admitted to [facility] further evaluation as well as management, patient made on continuous telemetry monitoring, of note patient received several units packed RBCs for transfusion, patient's antiplatelets were held, patient underwent sigmoidoscopy, found to have several hemorrhoids, underwent clipping, of note patient was also evaluated by OB/GYN, patient's hemoglobin and hematocrit remained stable, patient will require endometrial biopsy as an outpatient, as patient's hemoglobin hematocrit remained stable, patient's clinical status improved, blood pressure normalized, and because patient was transferred out of the ICU and after clearance by gastroenterology patient was deemed stable for discharge with holding antiplatelets and close outpatient follow-up with gastroenterology as well as OB/GYN, the plan was explained to the patient as well as the patient's guardian in detail were both agreeable with current plan.        _______________________________________________________________________  Patient seen and examined by me on discharge day. Pertinent Findings:  PHYSICAL EXAM:  Constitutional: No acute distress  Skin: Extremities and face reveal no rashes. HEENT: Sclerae anicteric. Extra-occular muscles are intact. No oral ulcers. The neck is supple and no masses. Cardiovascular: Regular rate and rhythm. Respiratory:  Clear breath sounds bilaterally with no wheezes, rales, or rhonchi. GI: Abdomen nondistended, soft, and nontender. Normal active bowel sounds. Musculoskeletal: No pitting edema of the lower legs. Neurological:  Patient is alert and oriented. Cranial nerves II-XII grossly intact,right sided weakness  Psychiatric: Mood appears appropriate   _______________________________________________________________________  DISCHARGE MEDICATIONS:   Current Discharge Medication List      START taking these medications    Details   pantoprazole (PROTONIX) 40 mg granules for oral suspension Take 40 mg by mouth Daily (before breakfast). Qty: 30 Each, Refills: 0  Start date: 11/22/2021         CONTINUE these medications which have NOT CHANGED    Details   divalproex (Depakote Sprinkles) 125 mg capsule Take 125 mg by mouth two (2) times a day. donepeziL (ARICEPT) 10 mg tablet Take 10 mg by mouth nightly. ferrous sulfate 220 mg (44 mg iron)/5 mL solution Take 220 mg by mouth two (2) times a day. folic acid (FOLVITE) 1 mg tablet Take 1 mg by mouth daily. gabapentin (NEURONTIN) 100 mg capsule Take 100 mg by mouth three (3) times daily.       glycopyrrolate (ROBINUL) 1 mg tablet Take 1 mg by mouth two (2) times a day. memantine (NAMENDA) 10 mg tablet Take 10 mg by mouth daily. magnesium hydroxide (Peña Milk of Magnesia) 400 mg/5 mL suspension Take 30 mL by mouth daily as needed for Constipation. benzocaine (ORAJEL) 20 % gel topical gel Apply  to affected area as needed for Pain.      potassium chloride (KLOR-CON M10) 10 mEq tablet Take 20 mEq by mouth daily. simethicone (GAS-X) 125 mg capsule Take 125 mg by mouth four (4) times daily as needed for Flatulence. traZODone (DESYREL) 50 mg tablet Take 50 mg by mouth nightly. simvastatin (Zocor) 20 mg tablet Take 20 mg by mouth nightly. STOP taking these medications       amLODIPine (NORVASC) 2.5 mg tablet Comments:   Reason for Stopping:         aspirin delayed-release 81 mg tablet Comments:   Reason for Stopping:         prasugreL (Effient) 10 mg tablet Comments:   Reason for Stopping:         HYDROcodone-acetaminophen (Norco) 5-325 mg per tablet Comments:   Reason for Stopping:                 Patient Follow Up Instructions: Activity: PT/OT Eval and Treat  Diet: Clear liquids, advance diet as per speech/swallow recommendations  Wound Care: As directed    Follow-up with PCP/Gastroenterology/Ob/GYN in 1 week.   Follow-up tests/labs As per above physicians  Follow-up Information     Follow up With Specialties Details Why Contact Info    Unknown, Provider, MD    Patient not available to ask      Ramesh Siddiqi MD Gastroenterology, Internal Medicine In 1 week  [address] [facility]  [phone]      Leti Trejo Gynecology In 1 week  [address] [facility]  [phone]          ________________________________________________________________    Risk of deterioration: Low    Condition at Discharge: Stable  __________________________________________________________________    Disposition  SNF/LTAC    ____________________________________________________________________    Code Status: Full Code  ___________________________________________________________________      Total time in minutes spent coordinating this discharge (includes going over instructions, follow-up, prescriptions, and preparing report for sign off to her PCP) :  40 minutes    Signed:   Mary Harris MD

## 2021-11-21 NOTE — PROGRESS NOTES
Second attempt to reach [phone] [facility] admit [email or web address] [phone] for pt DC this date. VM again left with request for call back. CM name and # provided for her use    Update: The pt will be accepted back to room#[id number]  Please call report to [phone] when transport time is known.     Floor notified

## 2021-11-21 NOTE — PROGRESS NOTES
Progress Note    Patient: Dimas Gloria MRN: [id number]  SSN: [id number]    YOB: 1955  Age: 74 y.o.   Sex: female      Admit Date: 11/17/2021    LOS: 3 days     Subjective:   Examined, no rectal  Bleeding,   no pain, noBM    Past Medical History:   Diagnosis Date    Aphasia as late effect of cerebrovascular accident     Cerebral aneurysm     Coronary artery disease     [facility]) ([facility]     Dementia ([facility]     Epilepsy ([facility]     GERD (gastroesophageal reflux disease)     Hyperlipidemia     Hypertension     Ischemic optic neuropathy of left eye     Right hemiparesis (HCC)         Current Facility-Administered Medications:     0.9% sodium chloride infusion 250 mL, 250 mL, IntraVENous, PRN, Berny Chicas NP    potassium chloride SR (KLOR-CON 10) tablet 20 mEq, 20 mEq, Oral, DAILY, Lexie Mccoy PA-C, 20 mEq at 11/20/21 1008    [START ON 11/21/2021] pantoprazole (PROTONIX) tablet 40 mg, 40 mg, Oral, ACB, Lexie Mccoy PA-C    divalproex (DEPAKOTE SPRINKLE) capsule 125 mg, 125 mg, Oral, BID, Antonio Urena DO, 125 mg at 11/20/21 2040    hydrALAZINE (APRESOLINE) 20 mg/mL injection 10 mg, 10 mg, IntraVENous, Q6H PRN, Lexie Mccoy PA-C, 10 mg at 11/20/21 1741    senna-docusate (PERICOLACE) 8.6-50 mg per tablet 1 Tablet, 1 Tablet, Oral, BID, Antonio Urena DO, 1 Tablet at 11/20/21 2040    bisacodyL (DULCOLAX) suppository 10 mg, 10 mg, Rectal, DAILY PRN, Antonio Urena DO    folic acid (FOLVITE) tablet 1 mg, 1 mg, Oral, DAILY, Raf Rosenthal MD, 1 mg at 11/20/21 1742    donepeziL (ARICEPT) tablet 10 mg, 10 mg, Oral, QHS, Raf Rosenthal MD, 10 mg at 11/20/21 2040    memantine (NAMENDA) tablet 10 mg, 10 mg, Oral, DAILY, Maria Teresa Childs MD, 10 mg at 11/20/21 9414    simvastatin (ZOCOR) tablet 20 mg, 20 mg, Oral, QHS, Raf Rosenthal MD, 20 mg at 11/20/21 2040    traZODone (DESYREL) tablet 50 mg, 50 mg, Oral, QHS, Raf Rosenthal MD, 50 mg at 11/20/21 2040    0.45% sodium chloride infusion, 75 mL/hr, IntraVENous, CONTINUOUS, Antonio Urena, , Last Rate: 75 mL/hr at 11/20/21 2040, 75 mL/hr at 11/20/21 2040    0.9% sodium chloride infusion 250 mL, 250 mL, IntraVENous, PRN, Antonio Urena,     sodium chloride (NS) flush 5-40 mL, 5-40 mL, IntraVENous, PRN, Giana Guerra MD    sodium chloride (NS) flush 5-40 mL, 5-40 mL, IntraVENous, PRN, Raf Smith MD    acetaminophen (TYLENOL) tablet 650 mg, 650 mg, Oral, Q6H PRN **OR** acetaminophen (TYLENOL) suppository 650 mg, 650 mg, Rectal, Q6H PRN, Raf Smith MD    polyethylene glycol [facility]) packet 17 g, 17 g, Oral, DAILY PRN, Raf Smith MD    ondansetron (ZOFRAN ODT) tablet 4 mg, 4 mg, Oral, Q8H PRN **OR** ondansetron (ZOFRAN) injection 4 mg, 4 mg, IntraVENous, Q6H PRN, Soledad Conde MD    Objective:     Vitals:    11/20/21 1649 11/20/21 1700 11/20/21 2000 11/20/21 2020   BP: (!) 168/88   131/78   Pulse:   90 90   Resp:    16   Temp:  98.7 °F (37.1 °C)  98.8 °F (37.1 °C)   SpO2:    99%   Weight:       Height:            Intake and Output:  Current Shift: No intake/output data recorded. Last three shifts: 11/19 0701 - 11/20 1900  In: 982   Out: 1250 [Urine:1250]    Physical Exam:   Physical Exam  Constitutional:       Appearance: She is ill-appearing. HENT:      Mouth/Throat:      Mouth: Mucous membranes are dry. Eyes:      General: No scleral icterus. Cardiovascular:      Rate and Rhythm: Normal rate. Musculoskeletal:         General: Normal range of motion. Cervical back: Neck supple. Skin:     General: Skin is warm.           Lab/Data Review:  Recent Results (from the past 24 hour(s))   CBC WITH AUTOMATED DIFF    Collection Time: 11/19/21 11:26 PM   Result Value Ref Range    WBC 6.6 3.6 - 11.0 K/uL    RBC 2.53 (L) 3.80 - 5.20 M/uL    HGB 6.9 (L) 11.5 - 16.0 g/dL    HCT 22.3 (L) 35.0 - 47.0 %    MCV 88.1 80.0 - 99.0 FL    MCH 27.3 26.0 - 34.0 PG    MCHC 30.9 30.0 - 36.5 g/dL RDW 17.0 (H) 11.5 - 14.5 %    PLATELET 172 695 - 395 K/uL    MPV 10.6 8.9 - 12.9 FL    NRBC 0.0 0.0  WBC    ABSOLUTE NRBC 0.00 0.00 - 0.01 K/uL    NEUTROPHILS 52 32 - 75 %    LYMPHOCYTES 32 12 - 49 %    MONOCYTES 12 5 - 13 %    EOSINOPHILS 3 0 - 7 %    BASOPHILS 1 0 - 1 %    IMMATURE GRANULOCYTES 0 0 - 0.5 %    ABS. NEUTROPHILS 3.4 1.8 - 8.0 K/UL    ABS. LYMPHOCYTES 2.1 0.8 - 3.5 K/UL    ABS. MONOCYTES 0.8 0.0 - 1.0 K/UL    ABS. EOSINOPHILS 0.2 0.0 - 0.4 K/UL    ABS. BASOPHILS 0.0 0.0 - 0.1 K/UL    ABS. IMM. GRANS. 0.0 0.00 - 0.04 K/UL    DF AUTOMATED     METABOLIC PANEL, BASIC    Collection Time: 11/20/21  5:43 AM   Result Value Ref Range    Sodium 146 (H) 136 - 145 mmol/L    Potassium 3.1 (L) 3.5 - 5.1 mmol/L    Chloride 115 (H) 97 - 108 mmol/L    CO2 26 21 - 32 mmol/L    Anion gap 5 5 - 15 mmol/L    Glucose 87 65 - 100 mg/dL    BUN 4 (L) 6 - 20 mg/dL    Creatinine 0.52 (L) 0.55 - 1.02 mg/dL    BUN/Creatinine ratio 8 (L) 12 - 20      GFR est AA >60 >60 ml/min/1.73m2    GFR est non-AA >60 >60 ml/min/1.73m2    Calcium 7.9 (L) 8.5 - 10.1 mg/dL   MAGNESIUM    Collection Time: 11/20/21  5:43 AM   Result Value Ref Range    Magnesium 2.0 1.6 - 2.4 mg/dL   PHOSPHORUS    Collection Time: 11/20/21  5:43 AM   Result Value Ref Range    Phosphorus 2.0 (L) 2.6 - 4.7 mg/dL   ALBUMIN    Collection Time: 11/20/21  5:43 AM   Result Value Ref Range    Albumin 3.6 3.5 - 5.0 g/dL   CBC W/O DIFF    Collection Time: 11/20/21  5:43 AM   Result Value Ref Range    WBC 7.2 3.6 - 11.0 K/uL    RBC 2.84 (L) 3.80 - 5.20 M/uL    HGB 8.0 (L) 11.5 - 16.0 g/dL    HCT 25.3 (L) 35.0 - 47.0 %    MCV 89.1 80.0 - 99.0 FL    MCH 28.2 26.0 - 34.0 PG    MCHC 31.6 30.0 - 36.5 g/dL    RDW 16.2 (H) 11.5 - 14.5 %    PLATELET 459 458 - 043 K/uL    MPV 10.7 8.9 - 12.9 FL    NRBC 0.0 0.0  WBC    ABSOLUTE NRBC 0.00 0.00 - 0.01 K/uL        CTA ABD PELV W WO CONT   Final Result   1.  Enhancing linear structure in the perianal region which is not seen on the   delayed images. This could represent a prominent vessel/external hemorrhoid or   may represent active bleeding. . There is adjacent gas and fluid and possibility   of a perianal abscess cannot be excluded. 2. Distention of the rectum with fecal material concerning for impaction. 3. Subtle low-density focus in the pancreatic head concerning for a mass,   recommend follow-up. 4. Other findings as described. US PELV NON OBS   Final Result   Limited study with no acute findings.       XR CHEST PORT   Final Result           Assessment:     Active Problems:    Vaginal bleeding (11/17/2021)    Rectal bleeding, from hemorrhoids, s/p treatment with Hemoclip,  Constipation, fecal impaction,    Plan:   Continue on the monitor hemoglobin,  Clear liquids,  Stool softener,  Avoid NSAIDs, anticoagulation,    Signed By: Hedy Woo MD     November 20, 2021        Thank you for allowing me to participate in this patients care  Cc Referring Physician   Unknown, Provider, MD

## 2021-11-21 NOTE — PROGRESS NOTES
Pulmonology and Critical Care Progress Note  Patient is a 57-year-old female with a past medical history of CVA with right-sided deficits and aphasia and on anticoagulation with prasugrel, nonverbal and resides in a nursing home that presented to the emergency room on 11/17/2021 for rectal bleeding. In the ED however it was reported to also have vaginal bleeding. She was hypotensive and transferred to the ICU on IV fluids. Subjective:          Patient seen and examined, got transferred to the floor. Patient remains hemodynamically stable and is on room air, wakes up easily and follows commands. Patient underwent a sigmoidoscopy with GI showing bleeding hemorrhoids, status post epinephrine spray and clips with hemostasis. Patient received 1 unit of packed red blood cells other night for some low blood pressure and her hemoglobin is stable at 7.3 this morning. Will need speech therapy to clear her prior to starting her on a diet given her history of aphasia.         Current Facility-Administered Medications   Medication Dose Route Frequency Provider Last Rate Last Admin    pantoprazole (PROTONIX) granules for oral suspension 40 mg  40 mg Oral ACB Lexie Mccoy PA-C   40 mg at 11/21/21 0813    0.9% sodium chloride infusion 250 mL  250 mL IntraVENous PRN Berny Chicas, NP        potassium chloride SR (KLOR-CON 10) tablet 20 mEq  20 mEq Oral DAILY Lexie Mccoy PA-C   20 mEq at 11/21/21 0813    divalproex (DEPAKOTE SPRINKLE) capsule 125 mg  125 mg Oral BID Oracio Rice, DO   125 mg at 11/21/21 0814    hydrALAZINE (APRESOLINE) 20 mg/mL injection 10 mg  10 mg IntraVENous Q6H PRN Lexie Mccoy PA-C   10 mg at 11/20/21 1741    senna-docusate (PERICOLACE) 8.6-50 mg per tablet 1 Tablet  1 Tablet Oral BID Oracio Rice, DO   1 Tablet at 11/21/21 1985    bisacodyL (DULCOLAX) suppository 10 mg  10 mg Rectal DAILY PRN [place] Rice, DO        folic acid (FOLVITE) tablet 1 mg  1 mg Oral DAILY Mac Haji, Raf Saad Teixeira MD   1 mg at 21 9396    donepeziL (ARICEPT) tablet 10 mg  10 mg Oral QHS Raf Rosenthal MD   10 mg at 21    memantine (NAMENDA) tablet 10 mg  10 mg Oral DAILY Maria Teresa Childs MD   10 mg at 21 6727    simvastatin (ZOCOR) tablet 20 mg  20 mg Oral QHS Raf Rosenthal MD   20 mg at 21    traZODone (DESYREL) tablet 50 mg  50 mg Oral QHS Raf Rosenthal MD   50 mg at 21    0.45% sodium chloride infusion  75 mL/hr IntraVENous CONTINUOUS Rochele Papa, DO 75 mL/hr at 21 0749 75 mL/hr at 21 0749    0.9% sodium chloride infusion 250 mL  250 mL IntraVENous PRN Antonio Urena DO        sodium chloride (NS) flush 5-40 mL  5-40 mL IntraVENous PRN Jackson Condon MD        sodium chloride (NS) flush 5-40 mL  5-40 mL IntraVENous PRN Maria Teresa Childs MD        acetaminophen (TYLENOL) tablet 650 mg  650 mg Oral Q6H PRN Maria Teresa Childs MD        Or   Eliza Favorite acetaminophen (TYLENOL) suppository 650 mg  650 mg Rectal Q6H PRN Maria Teresa Childs MD        polyethylene glycol [facility]) packet 17 g  17 g Oral DAILY PRN Maria Teresa Childs MD        ondansetron [facility] ODT) tablet 4 mg  4 mg Oral Q8H PRN Maria Teresa Childs MD        Or    ondansetron [facility]) injection 4 mg  4 mg IntraVENous Q6H PRN Maria Teresa Childs MD              No Known Allergies    Review of Systems:  A comprehensive review of systems was negative except for that written in the HPI. Objective:     Blood pressure 128/61, pulse 88, temperature 98.9 °F (37.2 °C), resp. rate 16, height 5' 6\" (1.676 m), weight 96.9 kg (213 lb 10 oz), SpO2 100 %. Temp (24hrs), Av.9 °F (37.2 °C), Min:98.1 °F (36.7 °C), Max:99.5 °F (37.5 °C)      Intake and Output:  Current Shift: No intake/output data recorded. Last 3 Shifts:  1901 -  0700  In: 982   Out: 1100 [Urine:1100]    Physical Exam:   General appearance: alert, cooperative, no distress, appears stated age.   Patient is mostly nonverbal but expresses herself by nodding her head yes or no  Head: Normocephalic, without obvious abnormality, atraumatic  Eyes: negative  Neck: supple, symmetrical, trachea midline, no adenopathy and no JVD  Lungs: clear to auscultation bilaterally, currently on room air  Heart: regular rate and rhythm, S1, S2 normal, no murmur, click, rub or gallop  Abdomen: soft, non-tender. Bowel sounds normal. No masses,  no organomegaly  Extremities: extremities normal, atraumatic, no cyanosis or edema. Right upper extremity in sling  Pulses: 2+ and symmetric  Skin: Skin color, texture, turgor normal. No rashes or lesions  Lymph nodes: Cervical, supraclavicular, and axillary nodes normal.  Neurologic: Patient is aphasic but is following commands and answering questions appropriately, she is alert    Additional comments:None    Lab/Data Review: All lab results for the last 24 hours reviewed.   Recent Results (from the past 24 hour(s))   METABOLIC PANEL, BASIC    Collection Time: 11/21/21  8:40 AM   Result Value Ref Range    Sodium 146 (H) 136 - 145 mmol/L    Potassium 3.3 (L) 3.5 - 5.1 mmol/L    Chloride 113 (H) 97 - 108 mmol/L    CO2 25 21 - 32 mmol/L    Anion gap 8 5 - 15 mmol/L    Glucose 84 65 - 100 mg/dL    BUN 4 (L) 6 - 20 mg/dL    Creatinine 0.62 0.55 - 1.02 mg/dL    BUN/Creatinine ratio 6 (L) 12 - 20      GFR est AA >60 >60 ml/min/1.73m2    GFR est non-AA >60 >60 ml/min/1.73m2    Calcium 8.5 8.5 - 10.1 mg/dL   MAGNESIUM    Collection Time: 11/21/21  8:40 AM   Result Value Ref Range    Magnesium 1.8 1.6 - 2.4 mg/dL   PHOSPHORUS    Collection Time: 11/21/21  8:40 AM   Result Value Ref Range    Phosphorus 2.3 (L) 2.6 - 4.7 mg/dL   CBC WITH AUTOMATED DIFF    Collection Time: 11/21/21  9:30 AM   Result Value Ref Range    WBC 8.4 3.6 - 11.0 K/uL    RBC 3.56 (L) 3.80 - 5.20 M/uL    HGB 10.0 (L) 11.5 - 16.0 g/dL    HCT 30.9 (L) 35.0 - 47.0 %    MCV 86.8 80.0 - 99.0 FL    MCH 28.1 26.0 - 34.0 PG    MCHC 32.4 30.0 - 36.5 g/dL    RDW 16.3 (H) 11.5 - 14.5 % PLATELET 586 (L) 339 - 400 K/uL    NRBC 0.0 0.0  WBC    ABSOLUTE NRBC 0.00 0.00 - 0.01 K/uL    NEUTROPHILS 63 32 - 75 %    LYMPHOCYTES 25 12 - 49 %    MONOCYTES 9 5 - 13 %    EOSINOPHILS 1 0 - 7 %    BASOPHILS 1 0 - 1 %    IMMATURE GRANULOCYTES 1 (H) 0 - 0.5 %    ABS. NEUTROPHILS 5.4 1.8 - 8.0 K/UL    ABS. LYMPHOCYTES 2.1 0.8 - 3.5 K/UL    ABS. MONOCYTES 0.7 0.0 - 1.0 K/UL    ABS. EOSINOPHILS 0.1 0.0 - 0.4 K/UL    ABS. BASOPHILS 0.0 0.0 - 0.1 K/UL    ABS. IMM. GRANS. 0.1 (H) 0.00 - 0.04 K/UL    DF AUTOMATED           Chest X-Ray:   CTA ABD PELV W WO CONT   Final Result   1. Enhancing linear structure in the perianal region which is not seen on the   delayed images. This could represent a prominent vessel/external hemorrhoid or   may represent active bleeding. . There is adjacent gas and fluid and possibility   of a perianal abscess cannot be excluded. 2. Distention of the rectum with fecal material concerning for impaction. 3. Subtle low-density focus in the pancreatic head concerning for a mass,   recommend follow-up. 4. Other findings as described. US PELV NON OBS   Final Result   Limited study with no acute findings. XR CHEST PORT   Final Result          CT imaging:  CT Results  (Last 48 hours)    None          PFTs:   PFT Results  (Last 3 results in the past 10 years)    None            Assessment:     Patient is a 52-year-old -American female with a history of CVA with residual right-sided weakness and aphasia, GERD, CAD, dementia, hypertension, and hyperlipidemia who presented to the hospital with gastrointestinal bleeding. Plan:     1.)  Acute lower GI bleeding  -Presented with rectal bleeding, now status post 3 units packed red blood cells.   Hemoglobin stable at 7.3 today  -Appreciate assistance from gastroenterology, status post sigmoidoscopy on 11/18/2021 showing bleeding hemorrhoids now status post epinephrine spray and clips with hemostasis.  -Was a concern for vaginal bleeding on admission however suspect that this is actually from rectal/perianal bleeding.  -Continue to monitor CBCs closely.  -Patient got transferred to the floor.  -Has been stable. 2.)  Acute blood loss anemia  -Presented with rectal bleeding, now status post 3 units packed red blood cells. Globin stable at 7.3 today  -Appreciate assistance from gastroenterology, status post sigmoidoscopy on 11/18/2021 showing bleeding hemorrhoids now status post epinephrine spray and clips with hemostasis.  -Was a concern for vaginal bleeding on admission however suspect that this is actually from rectal/perianal bleeding.  -Hemoglobin  stable now.    3.)  Constipation/impaction  -As noticed on the sigmoidoscopy and CT abdomen/pelvis, nevertheless she is having bowel movements per the nursing staff  -Once cleared for diet, would recommend senna S twice daily and rectal suppositories if necessary. Suspect that she may not be a candidate for enemas given recent hemorrhoidal bleeding and treatment.  -We will need to watch closely. 4.)  Hypertension  -Blood pressure has actually been stable/low  -We will start the patient on some scheduled albumin over the next 24 hours     5.)  History of CVA/dementia  -Continue home Namenda/Aricept and hyperlipidemia medications  -Holding home antiplatelet agents given bleeding, may need neurology to weigh in as to timing of appropriate re-initiation  -When she is able to eat, would recommend speech therapy clearance for diet    Patient is getting discharged to rehab facility. Will sign off.   Thanks for involving us in the management of your patient    CODE STATUS: Full Code      Disposition and Family: Okay to transfer to the floor          Alexander Hernandez MD  [facility]  11/21/2021

## 2021-11-22 NOTE — PROGRESS NOTES
Vitals stable, Iv removed. Pt transferred to [address] [facility] [address] via Shayla Jimenez.

## 2021-11-22 NOTE — PROGRESS NOTES
Physician Progress Note      PATIENT:               Dia Gil  CSN #:                  [id number]  :                       1955  ADMIT DATE:       2021 2:29 PM  [address] DATE:        2021 8:55 PM  RESPONDING  PROVIDER #:        Myrtle Joy MD          QUERY TEXT:    Dear Dr. Lou Berg -    Pt admitted with rectal bleeding with acute blood loss anemia. Pt noted to have hypotension requiring fluid bolus, Albumin, ICU monitoring. If possible, please document in the progress notes and discharge summary if you are evaluating and/or treating any of the following: The medical record reflects the following:  Risk Factors: 74 F presented from NH with rectal bleeding; admitted for GI bleed, acute blood loss anemia  Clinical Indicators: per H&P \" became more hypotensive, managed with fluid boluses, and more profusely bleeding and status changed to ICU\"; per ED nurses note \"Pt change in condition bp dropped to 31/20 Dr. Nhan Herman notified, orders to add another bolus. Pt responds to voice new bp 74/56\"; per ICU nurses note \"entered room as endo team was cleaning up after procedure. Dr. Jocelyne Dumas became concerned that pt was hypovolemic and requested fluids and a pressor. Maps at that time was 30s 35/15 and repeat was 74/48 Dr. Ervin Melara notified. Orders received. Albumin and 500 bolus administered pt trendelenberged feet elevated. \"  Treatment: labs, PRBCs, GI consult, sigmoidoscopy with intervention, IV fluids boluses, IV Albumin    Thank you,  BOOKER PinonN, RN, [facility]  Clinical   [phone]  Options provided:  -- Hemorrhagic Shock  -- Hypovolemic Shock  -- Hypovolemia without Shock  -- Hypotension without Shock  -- Other - I will add my own diagnosis  -- Disagree - Not applicable / Not valid  -- Disagree - Clinically unable to determine / Unknown  -- Refer to Clinical Documentation Reviewer    PROVIDER RESPONSE TEXT:    This patient has Hemorrhagic Shock.     Query created by: Óscar Lay on 11/19/2021 8:10 AM      QUERY TEXT:    Dear Dr. Zaira Pearce -    Patient admitted with rectal bleeding with acute blood loss anemia and hypotension and is on chronic anticoagulation. If possible, please document in the progress notes and discharge summary if you are evaluating and/or treating any of the following: The medical record reflects the following:  Risk Factors: 74 F presented with rectal bleeding; admitted with rectal bleeding, acute blood loss anemia  Clinical Indicators: patient with hx of CVA and takes Effient; coagulation panel WNL; sigmoidoscopy noted bleeding hemorrhoids (clip and Epinephrine injection); admitted to ICU  Treatment: labs, sigmoidoscopy, PRBCs, GI consult, IV fluid boluses, IV Albumin    Thank you,  BOOKER MckeonN, RN, [facility]  Clinical   [phone]  Options provided:  -- Bleeding (Hemorrhagic disorder) associated with Effient. -- Bleeding unrelated to anticoagulation  -- Other - I will add my own diagnosis  -- Disagree - Not applicable / Not valid  -- Disagree - Clinically unable to determine / Unknown  -- Refer to Clinical Documentation Reviewer    PROVIDER RESPONSE TEXT:    This patient has bleeding associated with Effient.     Query created by: Óscar Lay on 11/19/2021 8:14 AM      Electronically signed by:  Rosa Schilling MD 11/22/2021 3:38 PM

## 2022-03-20 PROBLEM — N93.9 VAGINAL BLEEDING: Status: ACTIVE | Noted: 2021-11-17

## 2023-05-11 RX ORDER — GABAPENTIN 100 MG/1
100 CAPSULE ORAL 2 TIMES DAILY
Status: ON HOLD | COMMUNITY
End: 2023-05-14 | Stop reason: SDUPTHER

## 2023-05-11 RX ORDER — FOLIC ACID 1 MG/1
1 TABLET ORAL DAILY
COMMUNITY
End: 2023-05-12 | Stop reason: CLARIF

## 2023-05-11 RX ORDER — TRAZODONE HYDROCHLORIDE 50 MG/1
50 TABLET ORAL NIGHTLY
COMMUNITY

## 2023-05-11 RX ORDER — DONEPEZIL HYDROCHLORIDE 10 MG/1
10 TABLET, FILM COATED ORAL NIGHTLY
COMMUNITY

## 2023-05-11 RX ORDER — MEMANTINE HYDROCHLORIDE 10 MG/1
10 TABLET ORAL DAILY
COMMUNITY

## 2023-05-11 RX ORDER — PANTOPRAZOLE SODIUM 40 MG/1
FOR SUSPENSION ORAL
COMMUNITY
Start: 2021-11-22

## 2023-05-11 RX ORDER — FERROUS SULFATE 220 (44)/5
ELIXIR ORAL 2 TIMES DAILY
COMMUNITY

## 2023-05-11 RX ORDER — SIMVASTATIN 20 MG
20 TABLET ORAL NIGHTLY
COMMUNITY

## 2023-05-11 RX ORDER — POTASSIUM CHLORIDE 750 MG/1
TABLET, EXTENDED RELEASE ORAL DAILY
COMMUNITY

## 2023-05-11 RX ORDER — DIVALPROEX SODIUM 125 MG/1
CAPSULE, COATED PELLETS ORAL 2 TIMES DAILY
COMMUNITY

## 2023-05-11 RX ORDER — SIMETHICONE 125 MG
CAPSULE ORAL 4 TIMES DAILY PRN
COMMUNITY

## 2023-05-11 RX ORDER — GLYCOPYRROLATE 1 MG/1
TABLET ORAL 2 TIMES DAILY
COMMUNITY

## 2023-05-12 ENCOUNTER — APPOINTMENT (OUTPATIENT)
Facility: HOSPITAL | Age: 68
DRG: 309 | End: 2023-05-12
Payer: MEDICARE

## 2023-05-12 ENCOUNTER — HOSPITAL ENCOUNTER (INPATIENT)
Facility: HOSPITAL | Age: 68
LOS: 2 days | Discharge: NURSING, REHAB OR LONG-TERM CARE | DRG: 309 | End: 2023-05-14
Attending: EMERGENCY MEDICINE | Admitting: INTERNAL MEDICINE
Payer: MEDICARE

## 2023-05-12 DIAGNOSIS — I48.91 ATRIAL FIBRILLATION, UNSPECIFIED TYPE (HCC): Primary | ICD-10-CM

## 2023-05-12 DIAGNOSIS — I48.91 ATRIAL FIBRILLATION WITH RAPID VENTRICULAR RESPONSE (HCC): ICD-10-CM

## 2023-05-12 LAB
ALBUMIN SERPL-MCNC: 2.9 G/DL (ref 3.5–5)
ALBUMIN/GLOB SERPL: 0.7 (ref 1.1–2.2)
ALP SERPL-CCNC: 85 U/L (ref 45–117)
ALT SERPL-CCNC: 14 U/L (ref 12–78)
ANION GAP SERPL CALC-SCNC: 4 MMOL/L (ref 5–15)
APTT PPP: 31.1 SEC (ref 21.2–34.1)
AST SERPL W P-5'-P-CCNC: 14 U/L (ref 15–37)
BASOPHILS # BLD: 0.1 K/UL (ref 0–0.1)
BASOPHILS NFR BLD: 1 % (ref 0–1)
BILIRUB SERPL-MCNC: 0.3 MG/DL (ref 0.2–1)
BNP SERPL-MCNC: 77 PG/ML
BUN SERPL-MCNC: 13 MG/DL (ref 6–20)
BUN/CREAT SERPL: 19 (ref 12–20)
CA-I BLD-MCNC: 8.4 MG/DL (ref 8.5–10.1)
CHLORIDE SERPL-SCNC: 113 MMOL/L (ref 97–108)
CO2 SERPL-SCNC: 25 MMOL/L (ref 21–32)
CREAT SERPL-MCNC: 0.7 MG/DL (ref 0.55–1.02)
DIFFERENTIAL METHOD BLD: ABNORMAL
ECHO BSA: 1.93 M2
EKG ATRIAL RATE: 178 BPM
EKG DIAGNOSIS: NORMAL
EKG Q-T INTERVAL: 254 MS
EKG QRS DURATION: 76 MS
EKG QTC CALCULATION (BAZETT): 390 MS
EKG R AXIS: 38 DEGREES
EKG T AXIS: 215 DEGREES
EKG VENTRICULAR RATE: 142 BPM
EOSINOPHIL # BLD: 0 K/UL (ref 0–0.4)
EOSINOPHIL NFR BLD: 0 % (ref 0–7)
ERYTHROCYTE [DISTWIDTH] IN BLOOD BY AUTOMATED COUNT: 15.4 % (ref 11.5–14.5)
GLOBULIN SER CALC-MCNC: 4.3 G/DL (ref 2–4)
GLUCOSE SERPL-MCNC: 124 MG/DL (ref 65–100)
HCT VFR BLD AUTO: 40.8 % (ref 35–47)
HGB BLD-MCNC: 12.6 G/DL (ref 11.5–16)
IMM GRANULOCYTES # BLD AUTO: 0.1 K/UL (ref 0–0.04)
IMM GRANULOCYTES NFR BLD AUTO: 1 % (ref 0–0.5)
INR PPP: 1.1 (ref 0.9–1.1)
LYMPHOCYTES # BLD: 1.6 K/UL (ref 0.8–3.5)
LYMPHOCYTES NFR BLD: 13 % (ref 12–49)
MCH RBC QN AUTO: 28.2 PG (ref 26–34)
MCHC RBC AUTO-ENTMCNC: 30.9 G/DL (ref 30–36.5)
MCV RBC AUTO: 91.3 FL (ref 80–99)
MONOCYTES # BLD: 0.9 K/UL (ref 0–1)
MONOCYTES NFR BLD: 7 % (ref 5–13)
NEUTS SEG # BLD: 9.5 K/UL (ref 1.8–8)
NEUTS SEG NFR BLD: 78 % (ref 32–75)
NRBC # BLD: 0 K/UL (ref 0–0.01)
NRBC BLD-RTO: 0 PER 100 WBC
PLATELET # BLD AUTO: 280 K/UL (ref 150–400)
PMV BLD AUTO: 9.8 FL (ref 8.9–12.9)
POTASSIUM SERPL-SCNC: 4.2 MMOL/L (ref 3.5–5.1)
PROT SERPL-MCNC: 7.2 G/DL (ref 6.4–8.2)
PROTHROMBIN TIME: 14.1 SEC (ref 11.9–14.6)
RBC # BLD AUTO: 4.47 M/UL (ref 3.8–5.2)
SODIUM SERPL-SCNC: 142 MMOL/L (ref 136–145)
THERAPEUTIC RANGE: NORMAL SEC (ref 82–109)
TROPONIN I SERPL HS-MCNC: 7 NG/L (ref 0–51)
WBC # BLD AUTO: 12.1 K/UL (ref 3.6–11)

## 2023-05-12 PROCEDURE — 93005 ELECTROCARDIOGRAM TRACING: CPT | Performed by: EMERGENCY MEDICINE

## 2023-05-12 PROCEDURE — 93971 EXTREMITY STUDY: CPT | Performed by: INTERNAL MEDICINE

## 2023-05-12 PROCEDURE — 85610 PROTHROMBIN TIME: CPT

## 2023-05-12 PROCEDURE — 2500000003 HC RX 250 WO HCPCS: Performed by: EMERGENCY MEDICINE

## 2023-05-12 PROCEDURE — 96374 THER/PROPH/DIAG INJ IV PUSH: CPT

## 2023-05-12 PROCEDURE — 6370000000 HC RX 637 (ALT 250 FOR IP): Performed by: INTERNAL MEDICINE

## 2023-05-12 PROCEDURE — 99285 EMERGENCY DEPT VISIT HI MDM: CPT

## 2023-05-12 PROCEDURE — 36415 COLL VENOUS BLD VENIPUNCTURE: CPT

## 2023-05-12 PROCEDURE — 2580000003 HC RX 258: Performed by: EMERGENCY MEDICINE

## 2023-05-12 PROCEDURE — 80053 COMPREHEN METABOLIC PANEL: CPT

## 2023-05-12 PROCEDURE — 85730 THROMBOPLASTIN TIME PARTIAL: CPT

## 2023-05-12 PROCEDURE — 93971 EXTREMITY STUDY: CPT

## 2023-05-12 PROCEDURE — 83880 ASSAY OF NATRIURETIC PEPTIDE: CPT

## 2023-05-12 PROCEDURE — 2580000003 HC RX 258: Performed by: INTERNAL MEDICINE

## 2023-05-12 PROCEDURE — 2060000000 HC ICU INTERMEDIATE R&B

## 2023-05-12 PROCEDURE — 85025 COMPLETE CBC W/AUTO DIFF WBC: CPT

## 2023-05-12 PROCEDURE — 71045 X-RAY EXAM CHEST 1 VIEW: CPT

## 2023-05-12 PROCEDURE — 84484 ASSAY OF TROPONIN QUANT: CPT

## 2023-05-12 RX ORDER — SODIUM CHLORIDE 9 MG/ML
INJECTION, SOLUTION INTRAVENOUS PRN
Status: DISCONTINUED | OUTPATIENT
Start: 2023-05-12 | End: 2023-05-14 | Stop reason: HOSPADM

## 2023-05-12 RX ORDER — ONDANSETRON 4 MG/1
4 TABLET, ORALLY DISINTEGRATING ORAL EVERY 8 HOURS PRN
Status: DISCONTINUED | OUTPATIENT
Start: 2023-05-12 | End: 2023-05-14 | Stop reason: HOSPADM

## 2023-05-12 RX ORDER — ASPIRIN 81 MG/1
81 TABLET ORAL DAILY
Status: DISCONTINUED | OUTPATIENT
Start: 2023-05-12 | End: 2023-05-14 | Stop reason: HOSPADM

## 2023-05-12 RX ORDER — POLYETHYLENE GLYCOL 3350 17 G/17G
17 POWDER, FOR SOLUTION ORAL DAILY PRN
Status: DISCONTINUED | OUTPATIENT
Start: 2023-05-12 | End: 2023-05-14 | Stop reason: HOSPADM

## 2023-05-12 RX ORDER — DILTIAZEM HYDROCHLORIDE 5 MG/ML
10 INJECTION INTRAVENOUS ONCE
Status: COMPLETED | OUTPATIENT
Start: 2023-05-12 | End: 2023-05-12

## 2023-05-12 RX ORDER — GLYCOPYRROLATE 1 MG/1
1 TABLET ORAL 2 TIMES DAILY PRN
Status: DISCONTINUED | OUTPATIENT
Start: 2023-05-12 | End: 2023-05-14 | Stop reason: HOSPADM

## 2023-05-12 RX ORDER — MEMANTINE HYDROCHLORIDE 10 MG/1
10 TABLET ORAL DAILY
Status: DISCONTINUED | OUTPATIENT
Start: 2023-05-12 | End: 2023-05-14 | Stop reason: HOSPADM

## 2023-05-12 RX ORDER — DONEPEZIL HYDROCHLORIDE 5 MG/1
10 TABLET, FILM COATED ORAL NIGHTLY
Status: DISCONTINUED | OUTPATIENT
Start: 2023-05-12 | End: 2023-05-14 | Stop reason: HOSPADM

## 2023-05-12 RX ORDER — ACETAMINOPHEN 325 MG/1
650 TABLET ORAL EVERY 6 HOURS PRN
Status: DISCONTINUED | OUTPATIENT
Start: 2023-05-12 | End: 2023-05-14 | Stop reason: HOSPADM

## 2023-05-12 RX ORDER — SODIUM CHLORIDE 0.9 % (FLUSH) 0.9 %
5-40 SYRINGE (ML) INJECTION PRN
Status: DISCONTINUED | OUTPATIENT
Start: 2023-05-12 | End: 2023-05-14 | Stop reason: HOSPADM

## 2023-05-12 RX ORDER — ONDANSETRON 2 MG/ML
4 INJECTION INTRAMUSCULAR; INTRAVENOUS EVERY 6 HOURS PRN
Status: DISCONTINUED | OUTPATIENT
Start: 2023-05-12 | End: 2023-05-14 | Stop reason: HOSPADM

## 2023-05-12 RX ORDER — POTASSIUM CHLORIDE 20 MEQ/1
10 TABLET, EXTENDED RELEASE ORAL DAILY
Status: DISCONTINUED | OUTPATIENT
Start: 2023-05-12 | End: 2023-05-14 | Stop reason: HOSPADM

## 2023-05-12 RX ORDER — DIVALPROEX SODIUM 125 MG/1
125 CAPSULE, COATED PELLETS ORAL 2 TIMES DAILY
Status: DISCONTINUED | OUTPATIENT
Start: 2023-05-12 | End: 2023-05-14 | Stop reason: HOSPADM

## 2023-05-12 RX ORDER — ASPIRIN 81 MG/1
81 TABLET ORAL DAILY
COMMUNITY

## 2023-05-12 RX ORDER — SODIUM CHLORIDE 0.9 % (FLUSH) 0.9 %
5-40 SYRINGE (ML) INJECTION EVERY 12 HOURS SCHEDULED
Status: DISCONTINUED | OUTPATIENT
Start: 2023-05-12 | End: 2023-05-14 | Stop reason: HOSPADM

## 2023-05-12 RX ORDER — FERROUS SULFATE 300 MG/5ML
220 LIQUID (ML) ORAL 2 TIMES DAILY
Status: DISCONTINUED | OUTPATIENT
Start: 2023-05-12 | End: 2023-05-14 | Stop reason: HOSPADM

## 2023-05-12 RX ORDER — ACETAMINOPHEN 650 MG/1
650 SUPPOSITORY RECTAL EVERY 6 HOURS PRN
Status: DISCONTINUED | OUTPATIENT
Start: 2023-05-12 | End: 2023-05-14 | Stop reason: HOSPADM

## 2023-05-12 RX ORDER — SIMETHICONE 125 MG
125 TABLET,CHEWABLE ORAL 4 TIMES DAILY PRN
Status: DISCONTINUED | OUTPATIENT
Start: 2023-05-12 | End: 2023-05-14 | Stop reason: HOSPADM

## 2023-05-12 RX ORDER — PANTOPRAZOLE SODIUM 40 MG/1
40 TABLET, DELAYED RELEASE ORAL
Status: DISCONTINUED | OUTPATIENT
Start: 2023-05-13 | End: 2023-05-14 | Stop reason: HOSPADM

## 2023-05-12 RX ORDER — AMLODIPINE BESYLATE 5 MG/1
5 TABLET ORAL DAILY
Status: DISCONTINUED | OUTPATIENT
Start: 2023-05-12 | End: 2023-05-14 | Stop reason: HOSPADM

## 2023-05-12 RX ORDER — TRAZODONE HYDROCHLORIDE 50 MG/1
50 TABLET ORAL NIGHTLY
Status: DISCONTINUED | OUTPATIENT
Start: 2023-05-12 | End: 2023-05-14 | Stop reason: HOSPADM

## 2023-05-12 RX ORDER — AMLODIPINE BESYLATE 5 MG/1
5 TABLET ORAL DAILY
Status: ON HOLD | COMMUNITY
End: 2023-05-14 | Stop reason: HOSPADM

## 2023-05-12 RX ORDER — ATORVASTATIN CALCIUM 10 MG/1
10 TABLET, FILM COATED ORAL DAILY
Status: DISCONTINUED | OUTPATIENT
Start: 2023-05-12 | End: 2023-05-14 | Stop reason: HOSPADM

## 2023-05-12 RX ORDER — GABAPENTIN 100 MG/1
100 CAPSULE ORAL 2 TIMES DAILY
Status: DISCONTINUED | OUTPATIENT
Start: 2023-05-12 | End: 2023-05-14 | Stop reason: HOSPADM

## 2023-05-12 RX ADMIN — POTASSIUM CHLORIDE 10 MEQ: 1500 TABLET, EXTENDED RELEASE ORAL at 21:08

## 2023-05-12 RX ADMIN — TRAZODONE HYDROCHLORIDE 50 MG: 50 TABLET ORAL at 21:08

## 2023-05-12 RX ADMIN — MINERAL SUPPLEMENT IRON 300 MG / 5 ML STRENGTH LIQUID 100 PER BOX UNFLAVORED 220 MG: at 21:07

## 2023-05-12 RX ADMIN — ASPIRIN 81 MG: 81 TABLET, COATED ORAL at 21:08

## 2023-05-12 RX ADMIN — DIVALPROEX SODIUM 125 MG: 125 CAPSULE, COATED PELLETS ORAL at 21:08

## 2023-05-12 RX ADMIN — ACETAMINOPHEN 650 MG: 325 TABLET ORAL at 21:08

## 2023-05-12 RX ADMIN — MEMANTINE HYDROCHLORIDE 10 MG: 10 TABLET ORAL at 21:08

## 2023-05-12 RX ADMIN — GABAPENTIN 100 MG: 100 CAPSULE ORAL at 21:08

## 2023-05-12 RX ADMIN — ATORVASTATIN CALCIUM 10 MG: 10 TABLET, FILM COATED ORAL at 21:09

## 2023-05-12 RX ADMIN — SODIUM CHLORIDE, PRESERVATIVE FREE 10 ML: 5 INJECTION INTRAVENOUS at 21:11

## 2023-05-12 RX ADMIN — DILTIAZEM HYDROCHLORIDE 10 MG: 5 INJECTION INTRAVENOUS at 16:13

## 2023-05-12 RX ADMIN — DILTIAZEM HYDROCHLORIDE 5 MG/HR: 5 INJECTION INTRAVENOUS at 16:13

## 2023-05-12 RX ADMIN — DONEPEZIL HYDROCHLORIDE 10 MG: 5 TABLET, FILM COATED ORAL at 21:08

## 2023-05-12 ASSESSMENT — PAIN DESCRIPTION - DESCRIPTORS: DESCRIPTORS: ACHING

## 2023-05-12 ASSESSMENT — PAIN SCALES - GENERAL
PAINLEVEL_OUTOF10: 0
PAINLEVEL_OUTOF10: 3

## 2023-05-12 ASSESSMENT — PAIN DESCRIPTION - ORIENTATION: ORIENTATION: RIGHT

## 2023-05-12 ASSESSMENT — PAIN DESCRIPTION - LOCATION: LOCATION: LEG

## 2023-05-12 ASSESSMENT — PAIN SCALES - WONG BAKER: WONGBAKER_NUMERICALRESPONSE: 0

## 2023-05-13 LAB
ALBUMIN SERPL-MCNC: 2.7 G/DL (ref 3.5–5)
ALBUMIN/GLOB SERPL: 0.6 (ref 1.1–2.2)
ALP SERPL-CCNC: 82 U/L (ref 45–117)
ALT SERPL-CCNC: 14 U/L (ref 12–78)
ANION GAP SERPL CALC-SCNC: 3 MMOL/L (ref 5–15)
AST SERPL W P-5'-P-CCNC: 11 U/L (ref 15–37)
BASOPHILS # BLD: 0 K/UL (ref 0–0.1)
BASOPHILS NFR BLD: 1 % (ref 0–1)
BILIRUB SERPL-MCNC: 0.3 MG/DL (ref 0.2–1)
BUN SERPL-MCNC: 13 MG/DL (ref 6–20)
BUN/CREAT SERPL: 19 (ref 12–20)
CA-I BLD-MCNC: 8.4 MG/DL (ref 8.5–10.1)
CHLORIDE SERPL-SCNC: 112 MMOL/L (ref 97–108)
CK SERPL-CCNC: 108 U/L (ref 26–192)
CK SERPL-CCNC: 122 U/L (ref 26–192)
CO2 SERPL-SCNC: 27 MMOL/L (ref 21–32)
CREAT SERPL-MCNC: 0.69 MG/DL (ref 0.55–1.02)
DIFFERENTIAL METHOD BLD: ABNORMAL
EOSINOPHIL # BLD: 0.2 K/UL (ref 0–0.4)
EOSINOPHIL NFR BLD: 3 % (ref 0–7)
ERYTHROCYTE [DISTWIDTH] IN BLOOD BY AUTOMATED COUNT: 15.5 % (ref 11.5–14.5)
GLOBULIN SER CALC-MCNC: 4.6 G/DL (ref 2–4)
GLUCOSE SERPL-MCNC: 84 MG/DL (ref 65–100)
HCT VFR BLD AUTO: 41 % (ref 35–47)
HGB BLD-MCNC: 12.5 G/DL (ref 11.5–16)
IMM GRANULOCYTES # BLD AUTO: 0 K/UL (ref 0–0.04)
IMM GRANULOCYTES NFR BLD AUTO: 0 % (ref 0–0.5)
LYMPHOCYTES # BLD: 2.4 K/UL (ref 0.8–3.5)
LYMPHOCYTES NFR BLD: 29 % (ref 12–49)
MCH RBC QN AUTO: 28 PG (ref 26–34)
MCHC RBC AUTO-ENTMCNC: 30.5 G/DL (ref 30–36.5)
MCV RBC AUTO: 91.9 FL (ref 80–99)
MONOCYTES # BLD: 0.7 K/UL (ref 0–1)
MONOCYTES NFR BLD: 9 % (ref 5–13)
NEUTS SEG # BLD: 4.9 K/UL (ref 1.8–8)
NEUTS SEG NFR BLD: 58 % (ref 32–75)
NRBC # BLD: 0 K/UL (ref 0–0.01)
NRBC BLD-RTO: 0 PER 100 WBC
PLATELET # BLD AUTO: 251 K/UL (ref 150–400)
PMV BLD AUTO: 10.2 FL (ref 8.9–12.9)
POTASSIUM SERPL-SCNC: 3.8 MMOL/L (ref 3.5–5.1)
PROT SERPL-MCNC: 7.3 G/DL (ref 6.4–8.2)
RBC # BLD AUTO: 4.46 M/UL (ref 3.8–5.2)
SODIUM SERPL-SCNC: 142 MMOL/L (ref 136–145)
TROPONIN I SERPL HS-MCNC: 9 NG/L (ref 0–51)
TROPONIN I SERPL HS-MCNC: 9 NG/L (ref 0–51)
WBC # BLD AUTO: 8.3 K/UL (ref 3.6–11)

## 2023-05-13 PROCEDURE — 84484 ASSAY OF TROPONIN QUANT: CPT

## 2023-05-13 PROCEDURE — 92610 EVALUATE SWALLOWING FUNCTION: CPT

## 2023-05-13 PROCEDURE — 2060000000 HC ICU INTERMEDIATE R&B

## 2023-05-13 PROCEDURE — 80053 COMPREHEN METABOLIC PANEL: CPT

## 2023-05-13 PROCEDURE — 82550 ASSAY OF CK (CPK): CPT

## 2023-05-13 PROCEDURE — 36415 COLL VENOUS BLD VENIPUNCTURE: CPT

## 2023-05-13 PROCEDURE — 2580000003 HC RX 258: Performed by: INTERNAL MEDICINE

## 2023-05-13 PROCEDURE — 85025 COMPLETE CBC W/AUTO DIFF WBC: CPT

## 2023-05-13 PROCEDURE — 6370000000 HC RX 637 (ALT 250 FOR IP): Performed by: INTERNAL MEDICINE

## 2023-05-13 RX ORDER — METOPROLOL SUCCINATE 25 MG/1
25 TABLET, EXTENDED RELEASE ORAL DAILY
Status: DISCONTINUED | OUTPATIENT
Start: 2023-05-13 | End: 2023-05-14 | Stop reason: HOSPADM

## 2023-05-13 RX ADMIN — SODIUM CHLORIDE, PRESERVATIVE FREE 10 ML: 5 INJECTION INTRAVENOUS at 22:14

## 2023-05-13 RX ADMIN — TRAZODONE HYDROCHLORIDE 50 MG: 50 TABLET ORAL at 22:07

## 2023-05-13 RX ADMIN — GABAPENTIN 100 MG: 100 CAPSULE ORAL at 22:07

## 2023-05-13 RX ADMIN — DIVALPROEX SODIUM 125 MG: 125 CAPSULE, COATED PELLETS ORAL at 22:07

## 2023-05-13 RX ADMIN — MINERAL SUPPLEMENT IRON 300 MG / 5 ML STRENGTH LIQUID 100 PER BOX UNFLAVORED 220 MG: at 09:05

## 2023-05-13 RX ADMIN — GABAPENTIN 100 MG: 100 CAPSULE ORAL at 09:05

## 2023-05-13 RX ADMIN — MEMANTINE HYDROCHLORIDE 10 MG: 10 TABLET ORAL at 09:05

## 2023-05-13 RX ADMIN — MINERAL SUPPLEMENT IRON 300 MG / 5 ML STRENGTH LIQUID 100 PER BOX UNFLAVORED 220 MG: at 22:07

## 2023-05-13 RX ADMIN — SODIUM CHLORIDE, PRESERVATIVE FREE 10 ML: 5 INJECTION INTRAVENOUS at 09:05

## 2023-05-13 RX ADMIN — ATORVASTATIN CALCIUM 10 MG: 10 TABLET, FILM COATED ORAL at 09:05

## 2023-05-13 RX ADMIN — ASPIRIN 81 MG: 81 TABLET, COATED ORAL at 09:05

## 2023-05-13 RX ADMIN — DIVALPROEX SODIUM 125 MG: 125 CAPSULE, COATED PELLETS ORAL at 09:05

## 2023-05-13 RX ADMIN — POTASSIUM CHLORIDE 10 MEQ: 1500 TABLET, EXTENDED RELEASE ORAL at 09:05

## 2023-05-13 RX ADMIN — DONEPEZIL HYDROCHLORIDE 10 MG: 5 TABLET, FILM COATED ORAL at 22:07

## 2023-05-13 ASSESSMENT — PAIN SCALES - GENERAL: PAINLEVEL_OUTOF10: 0

## 2023-05-14 ENCOUNTER — APPOINTMENT (OUTPATIENT)
Facility: HOSPITAL | Age: 68
DRG: 309 | End: 2023-05-14
Payer: MEDICARE

## 2023-05-14 VITALS
WEIGHT: 219.8 LBS | SYSTOLIC BLOOD PRESSURE: 114 MMHG | OXYGEN SATURATION: 94 % | TEMPERATURE: 97.3 F | HEIGHT: 58 IN | BODY MASS INDEX: 46.14 KG/M2 | HEART RATE: 57 BPM | DIASTOLIC BLOOD PRESSURE: 71 MMHG | RESPIRATION RATE: 15 BRPM

## 2023-05-14 PROCEDURE — 2580000003 HC RX 258: Performed by: INTERNAL MEDICINE

## 2023-05-14 PROCEDURE — 93306 TTE W/DOPPLER COMPLETE: CPT

## 2023-05-14 PROCEDURE — 6370000000 HC RX 637 (ALT 250 FOR IP): Performed by: INTERNAL MEDICINE

## 2023-05-14 RX ORDER — METOPROLOL SUCCINATE 25 MG/1
25 TABLET, EXTENDED RELEASE ORAL DAILY
Qty: 30 TABLET | Refills: 3 | Status: SHIPPED | OUTPATIENT
Start: 2023-05-15

## 2023-05-14 RX ORDER — GABAPENTIN 100 MG/1
100 CAPSULE ORAL 2 TIMES DAILY
Qty: 90 CAPSULE | Refills: 0 | Status: SHIPPED | OUTPATIENT
Start: 2023-05-14 | End: 2023-05-16

## 2023-05-14 RX ADMIN — SODIUM CHLORIDE, PRESERVATIVE FREE 10 ML: 5 INJECTION INTRAVENOUS at 08:59

## 2023-05-14 RX ADMIN — MINERAL SUPPLEMENT IRON 300 MG / 5 ML STRENGTH LIQUID 100 PER BOX UNFLAVORED 220 MG: at 08:16

## 2023-05-14 RX ADMIN — POTASSIUM CHLORIDE 10 MEQ: 1500 TABLET, EXTENDED RELEASE ORAL at 08:16

## 2023-05-14 RX ADMIN — DIVALPROEX SODIUM 125 MG: 125 CAPSULE, COATED PELLETS ORAL at 08:16

## 2023-05-14 RX ADMIN — ASPIRIN 81 MG: 81 TABLET, COATED ORAL at 08:16

## 2023-05-14 RX ADMIN — ATORVASTATIN CALCIUM 10 MG: 10 TABLET, FILM COATED ORAL at 08:16

## 2023-05-14 RX ADMIN — GABAPENTIN 100 MG: 100 CAPSULE ORAL at 08:16

## 2023-05-14 RX ADMIN — METOPROLOL SUCCINATE 25 MG: 25 TABLET, EXTENDED RELEASE ORAL at 08:16

## 2023-05-14 RX ADMIN — MEMANTINE HYDROCHLORIDE 10 MG: 10 TABLET ORAL at 08:16

## 2023-05-14 RX ADMIN — PANTOPRAZOLE SODIUM 40 MG: 40 TABLET, DELAYED RELEASE ORAL at 06:26

## 2023-05-15 LAB
ECHO AO ASC DIAM: 3 CM
ECHO AO ASCENDING AORTA INDEX: 1.58 CM/M2
ECHO AO ROOT DIAM: 2.9 CM
ECHO AO ROOT INDEX: 1.53 CM/M2
ECHO AV AREA PEAK VELOCITY: 2 CM2
ECHO AV AREA VTI: 2 CM2
ECHO AV AREA/BSA PEAK VELOCITY: 1.1 CM2/M2
ECHO AV AREA/BSA VTI: 1.1 CM2/M2
ECHO AV MEAN GRADIENT: 6 MMHG
ECHO AV MEAN VELOCITY: 1.2 M/S
ECHO AV PEAK GRADIENT: 12 MMHG
ECHO AV PEAK VELOCITY: 1.8 M/S
ECHO AV VELOCITY RATIO: 0.61
ECHO AV VTI: 39.6 CM
ECHO BSA: 1.93 M2
ECHO EST RA PRESSURE: 15 MMHG
ECHO IVC PROX: 2.3 CM
ECHO LA AREA 2C: 17.8 CM2
ECHO LA AREA 4C: 17.8 CM2
ECHO LA DIAMETER INDEX: 1.42 CM/M2
ECHO LA DIAMETER: 2.7 CM
ECHO LA MAJOR AXIS: 6 CM
ECHO LA MINOR AXIS: 5.8 CM
ECHO LA TO AORTIC ROOT RATIO: 0.93
ECHO LA VOL 2C: 45 ML (ref 22–52)
ECHO LA VOL 4C: 43 ML (ref 22–52)
ECHO LA VOL BP: 44 ML (ref 22–52)
ECHO LA VOL/BSA BIPLANE: 23 ML/M2 (ref 16–34)
ECHO LA VOLUME INDEX A2C: 24 ML/M2 (ref 16–34)
ECHO LA VOLUME INDEX A4C: 23 ML/M2 (ref 16–34)
ECHO LV E' LATERAL VELOCITY: 7 CM/S
ECHO LV E' SEPTAL VELOCITY: 6 CM/S
ECHO LV EDV A2C: 51 ML
ECHO LV EDV A4C: 63 ML
ECHO LV EDV INDEX A4C: 33 ML/M2
ECHO LV EDV NDEX A2C: 27 ML/M2
ECHO LV EJECTION FRACTION A2C: 63 %
ECHO LV EJECTION FRACTION A4C: 57 %
ECHO LV EJECTION FRACTION BIPLANE: 59 % (ref 55–100)
ECHO LV ESV A2C: 19 ML
ECHO LV ESV A4C: 27 ML
ECHO LV ESV INDEX A2C: 10 ML/M2
ECHO LV ESV INDEX A4C: 14 ML/M2
ECHO LV FRACTIONAL SHORTENING: 36 % (ref 28–44)
ECHO LV INTERNAL DIMENSION DIASTOLE INDEX: 2.05 CM/M2
ECHO LV INTERNAL DIMENSION DIASTOLIC: 3.9 CM (ref 3.9–5.3)
ECHO LV INTERNAL DIMENSION SYSTOLIC INDEX: 1.32 CM/M2
ECHO LV INTERNAL DIMENSION SYSTOLIC: 2.5 CM
ECHO LV IVSD: 1.5 CM (ref 0.6–0.9)
ECHO LV MASS 2D: 212.9 G (ref 67–162)
ECHO LV MASS INDEX 2D: 112 G/M2 (ref 43–95)
ECHO LV POSTERIOR WALL DIASTOLIC: 1.4 CM (ref 0.6–0.9)
ECHO LV RELATIVE WALL THICKNESS RATIO: 0.72
ECHO LVOT AREA: 3.1 CM2
ECHO LVOT AV VTI INDEX: 0.65
ECHO LVOT DIAM: 2 CM
ECHO LVOT MEAN GRADIENT: 3 MMHG
ECHO LVOT PEAK GRADIENT: 5 MMHG
ECHO LVOT PEAK VELOCITY: 1.1 M/S
ECHO LVOT STROKE VOLUME INDEX: 42.5 ML/M2
ECHO LVOT SV: 80.7 ML
ECHO LVOT VTI: 25.7 CM
ECHO MV A VELOCITY: 0.96 M/S
ECHO MV AREA VTI: 2.1 CM2
ECHO MV E DECELERATION TIME (DT): 229 MS
ECHO MV E VELOCITY: 0.76 M/S
ECHO MV E/A RATIO: 0.79
ECHO MV E/E' LATERAL: 10.86
ECHO MV E/E' RATIO (AVERAGED): 11.76
ECHO MV E/E' SEPTAL: 12.67
ECHO MV LVOT VTI INDEX: 1.51
ECHO MV MAX VELOCITY: 1 M/S
ECHO MV MEAN GRADIENT: 2 MMHG
ECHO MV MEAN VELOCITY: 0.6 M/S
ECHO MV PEAK GRADIENT: 4 MMHG
ECHO MV REGURGITANT PEAK GRADIENT: 36 MMHG
ECHO MV REGURGITANT PEAK VELOCITY: 3 M/S
ECHO MV VTI: 38.9 CM
ECHO RA AREA 4C: 17.7 CM2
ECHO RA END SYSTOLIC VOLUME APICAL 4 CHAMBER INDEX BSA: 25 ML/M2
ECHO RA VOLUME: 47 ML
ECHO RIGHT VENTRICULAR SYSTOLIC PRESSURE (RVSP): 39 MMHG
ECHO RV BASAL DIMENSION: 3.7 CM
ECHO RV FREE WALL PEAK S': 13 CM/S
ECHO RV TAPSE: 1.8 CM (ref 1.7–?)
ECHO TV REGURGITANT MAX VELOCITY: 2.45 M/S
ECHO TV REGURGITANT PEAK GRADIENT: 24 MMHG

## 2023-10-28 ENCOUNTER — HOSPITAL ENCOUNTER (EMERGENCY)
Facility: HOSPITAL | Age: 68
Discharge: NURSING, REHAB OR LONG-TERM CARE | End: 2023-10-28
Attending: STUDENT IN AN ORGANIZED HEALTH CARE EDUCATION/TRAINING PROGRAM
Payer: MEDICARE

## 2023-10-28 ENCOUNTER — APPOINTMENT (OUTPATIENT)
Facility: HOSPITAL | Age: 68
End: 2023-10-28
Payer: MEDICARE

## 2023-10-28 VITALS
WEIGHT: 211 LBS | TEMPERATURE: 99.1 F | HEART RATE: 71 BPM | SYSTOLIC BLOOD PRESSURE: 120 MMHG | HEIGHT: 58 IN | RESPIRATION RATE: 19 BRPM | DIASTOLIC BLOOD PRESSURE: 68 MMHG | BODY MASS INDEX: 44.29 KG/M2 | OXYGEN SATURATION: 95 %

## 2023-10-28 DIAGNOSIS — U07.1 COVID-19: Primary | ICD-10-CM

## 2023-10-28 LAB
ALBUMIN SERPL-MCNC: 2.3 G/DL (ref 3.5–5)
ALBUMIN/GLOB SERPL: 0.4 (ref 1.1–2.2)
ALP SERPL-CCNC: ABNORMAL U/L (ref 45–117)
ALT SERPL-CCNC: 26 U/L (ref 12–78)
ANION GAP BLD CALC-SCNC: 12
ANION GAP BLD CALC-SCNC: 5
ANION GAP SERPL CALC-SCNC: 3 MMOL/L (ref 5–15)
AST SERPL W P-5'-P-CCNC: ABNORMAL U/L (ref 15–37)
BASOPHILS # BLD: 0 K/UL (ref 0–0.1)
BASOPHILS NFR BLD: 0 % (ref 0–1)
BILIRUB SERPL-MCNC: ABNORMAL MG/DL (ref 0.2–1)
BUN SERPL-MCNC: 9 MG/DL (ref 6–20)
BUN/CREAT SERPL: 10 (ref 12–20)
CA-I BLD-MCNC: 0.96 MMOL/L (ref 1.12–1.32)
CA-I BLD-MCNC: 1.06 MMOL/L (ref 1.12–1.32)
CA-I BLD-MCNC: 8.2 MG/DL (ref 8.5–10.1)
CHLORIDE BLD-SCNC: 109 MMOL/L (ref 98–107)
CHLORIDE BLD-SCNC: 111 MMOL/L (ref 98–107)
CHLORIDE SERPL-SCNC: 109 MMOL/L (ref 97–108)
CO2 BLD-SCNC: 24 MMOL/L
CO2 BLD-SCNC: 24 MMOL/L
CO2 SERPL-SCNC: 23 MMOL/L (ref 21–32)
CREAT SERPL-MCNC: 0.88 MG/DL (ref 0.55–1.02)
CREAT UR-MCNC: 0.47 MG/DL (ref 0.6–1.3)
CREAT UR-MCNC: 0.56 MG/DL (ref 0.6–1.3)
DIFFERENTIAL METHOD BLD: ABNORMAL
EOSINOPHIL # BLD: 0 K/UL (ref 0–0.4)
EOSINOPHIL NFR BLD: 0 % (ref 0–7)
ERYTHROCYTE [DISTWIDTH] IN BLOOD BY AUTOMATED COUNT: 15.4 % (ref 11.5–14.5)
FLUAV AG NPH QL IA: NEGATIVE
FLUBV AG NOSE QL IA: NEGATIVE
GLOBULIN SER CALC-MCNC: 6 G/DL (ref 2–4)
GLUCOSE BLD STRIP.AUTO-MCNC: 126 MG/DL (ref 65–100)
GLUCOSE BLD STRIP.AUTO-MCNC: 95 MG/DL (ref 65–100)
GLUCOSE SERPL-MCNC: 112 MG/DL (ref 65–100)
HCT VFR BLD AUTO: 41.7 % (ref 35–47)
HGB BLD-MCNC: 13.2 G/DL (ref 11.5–16)
IMM GRANULOCYTES # BLD AUTO: 0.1 K/UL (ref 0–0.04)
IMM GRANULOCYTES NFR BLD AUTO: 1 % (ref 0–0.5)
LACTATE BLD-SCNC: 0.58 MMOL/L (ref 0.4–2)
LACTATE BLD-SCNC: 2.09 MMOL/L (ref 0.4–2)
LYMPHOCYTES # BLD: 1.2 K/UL (ref 0.8–3.5)
LYMPHOCYTES NFR BLD: 13 % (ref 12–49)
MCH RBC QN AUTO: 27.9 PG (ref 26–34)
MCHC RBC AUTO-ENTMCNC: 31.7 G/DL (ref 30–36.5)
MCV RBC AUTO: 88.2 FL (ref 80–99)
MONOCYTES # BLD: 1.2 K/UL (ref 0–1)
MONOCYTES NFR BLD: 13 % (ref 5–13)
NEUTS SEG # BLD: 6.8 K/UL (ref 1.8–8)
NEUTS SEG NFR BLD: 73 % (ref 32–75)
NRBC # BLD: 0 K/UL (ref 0–0.01)
NRBC BLD-RTO: 0 PER 100 WBC
PERFORMED BY:: ABNORMAL
PERFORMED BY:: ABNORMAL
PLATELET # BLD AUTO: 242 K/UL (ref 150–400)
PMV BLD AUTO: 11.4 FL (ref 8.9–12.9)
POTASSIUM BLD-SCNC: 4.1 MMOL/L (ref 3.5–5.5)
POTASSIUM BLD-SCNC: 8.5 MMOL/L (ref 3.5–5.5)
POTASSIUM SERPL-SCNC: ABNORMAL MMOL/L (ref 3.5–5.1)
PROCALCITONIN SERPL-MCNC: <0.05 NG/ML
PROT SERPL-MCNC: 8.3 G/DL (ref 6.4–8.2)
RBC # BLD AUTO: 4.73 M/UL (ref 3.8–5.2)
SARS-COV-2 RDRP RESP QL NAA+PROBE: DETECTED
SERVICE CMNT-IMP: ABNORMAL
SODIUM BLD-SCNC: 137 MMOL/L (ref 136–145)
SODIUM BLD-SCNC: 146 MMOL/L (ref 136–145)
SODIUM SERPL-SCNC: 135 MMOL/L (ref 136–145)
SPECIMEN SITE: ABNORMAL
SPECIMEN SITE: ABNORMAL
TROPONIN I SERPL HS-MCNC: 12 NG/L (ref 0–51)
WBC # BLD AUTO: 9.3 K/UL (ref 3.6–11)

## 2023-10-28 PROCEDURE — 87635 SARS-COV-2 COVID-19 AMP PRB: CPT

## 2023-10-28 PROCEDURE — 80047 BASIC METABLC PNL IONIZED CA: CPT

## 2023-10-28 PROCEDURE — 83605 ASSAY OF LACTIC ACID: CPT

## 2023-10-28 PROCEDURE — 93005 ELECTROCARDIOGRAM TRACING: CPT | Performed by: STUDENT IN AN ORGANIZED HEALTH CARE EDUCATION/TRAINING PROGRAM

## 2023-10-28 PROCEDURE — 6370000000 HC RX 637 (ALT 250 FOR IP): Performed by: STUDENT IN AN ORGANIZED HEALTH CARE EDUCATION/TRAINING PROGRAM

## 2023-10-28 PROCEDURE — 80053 COMPREHEN METABOLIC PANEL: CPT

## 2023-10-28 PROCEDURE — 85025 COMPLETE CBC W/AUTO DIFF WBC: CPT

## 2023-10-28 PROCEDURE — 71045 X-RAY EXAM CHEST 1 VIEW: CPT

## 2023-10-28 PROCEDURE — 36415 COLL VENOUS BLD VENIPUNCTURE: CPT

## 2023-10-28 PROCEDURE — 96360 HYDRATION IV INFUSION INIT: CPT

## 2023-10-28 PROCEDURE — 99285 EMERGENCY DEPT VISIT HI MDM: CPT

## 2023-10-28 PROCEDURE — 87804 INFLUENZA ASSAY W/OPTIC: CPT

## 2023-10-28 PROCEDURE — 84484 ASSAY OF TROPONIN QUANT: CPT

## 2023-10-28 PROCEDURE — 87040 BLOOD CULTURE FOR BACTERIA: CPT

## 2023-10-28 PROCEDURE — 2580000003 HC RX 258: Performed by: STUDENT IN AN ORGANIZED HEALTH CARE EDUCATION/TRAINING PROGRAM

## 2023-10-28 PROCEDURE — 84145 PROCALCITONIN (PCT): CPT

## 2023-10-28 RX ORDER — 0.9 % SODIUM CHLORIDE 0.9 %
30 INTRAVENOUS SOLUTION INTRAVENOUS ONCE
Status: COMPLETED | OUTPATIENT
Start: 2023-10-28 | End: 2023-10-28

## 2023-10-28 RX ORDER — ACETAMINOPHEN 500 MG
1000 TABLET ORAL
Status: COMPLETED | OUTPATIENT
Start: 2023-10-28 | End: 2023-10-28

## 2023-10-28 RX ADMIN — ACETAMINOPHEN 1000 MG: 500 TABLET ORAL at 14:03

## 2023-10-28 RX ADMIN — SODIUM CHLORIDE 1413 ML: 9 INJECTION, SOLUTION INTRAVENOUS at 13:59

## 2023-10-28 ASSESSMENT — PAIN - FUNCTIONAL ASSESSMENT
PAIN_FUNCTIONAL_ASSESSMENT: NONE - DENIES PAIN
PAIN_FUNCTIONAL_ASSESSMENT: NONE - DENIES PAIN

## 2023-10-28 ASSESSMENT — LIFESTYLE VARIABLES
HOW MANY STANDARD DRINKS CONTAINING ALCOHOL DO YOU HAVE ON A TYPICAL DAY: PATIENT DOES NOT DRINK
HOW OFTEN DO YOU HAVE A DRINK CONTAINING ALCOHOL: NEVER

## 2023-10-28 NOTE — ED PROVIDER NOTES
[facility] EMERGENCY DEPT  EMERGENCY DEPARTMENT HISTORY AND PHYSICAL EXAM      Date: 10/28/2023  Patient Name: Diana Miller  MRN: [id number]  [address] [facility] 1955  Date of evaluation: 10/28/2023  Provider: Batsheva Valadez MD   Note Started: 1:44 PM EDT 10/28/23    HISTORY OF PRESENT ILLNESS     Chief Complaint   Patient presents with    Shortness of Breath     Pt brought in via ems for c/o sob by pts facility [address] [place] [address] and Rehab. Pt has c/o feeliing like needs to cough and get sputum up, per Ems febrile enroute no other complaints       History Provided By: Patient    HPI: Diana Miller is a 77 y.o. female PMH of HLD, HTN, CVA with right-sided hemiparesis, and dementia comes from SNF with concerns for shortness of breath. Patient is also found to be febrile. Patient is nonverbal at baseline due to aphasia due to CVA. She is answering questions by shaking her head. She is reporting chronic pain in the right side secondary to her hemiparesis but no new pain. Denies any chest pain or abdominal pain. No nausea or vomiting. Possible increasing or frequency. Denies any current shortness of breath. PAST MEDICAL HISTORY   Past Medical History:  Past Medical History:   Diagnosis Date    Aphasia as late effect of cerebrovascular accident     Cerebral aneurysm     Coronary artery disease     CVA (cerebral vascular accident) ([address])     Dementia ([address])     Epilepsy ([address])     GERD (gastroesophageal reflux disease)     Hyperlipidemia     Hypertension     Ischemic optic neuropathy of left eye     Right hemiparesis ([address])        Past Surgical History:  History reviewed. No pertinent surgical history. Family History:  History reviewed. No pertinent family history. Social History: Allergies:  No Known Allergies    PCP: None, None    Current Meds:   No current facility-administered medications for this encounter.      Current Outpatient Medications   Medication Sig Dispense Refill SPRINKLE     donepezil 10 MG tablet  Commonly known as: ARICEPT     ferrous sulfate 220 (44 Fe) MG/5ML solution     gabapentin 100 MG capsule  Commonly known as: NEURONTIN  Take 1 capsule by mouth 2 times daily for 3 doses. Max Daily Amount: 200 mg     glycopyrrolate 1 MG tablet  Commonly known as: ROBINUL     magnesium hydroxide 400 MG/5ML suspension  Commonly known as: MILK OF MAGNESIA     memantine 10 MG tablet  Commonly known as: NAMENDA     metoprolol succinate 25 MG extended release tablet  Commonly known as: TOPROL XL  Take 1 tablet by mouth daily     pantoprazole sodium 40 MG Pack packet  Commonly known as: PROTONIX     potassium chloride 10 MEQ extended release tablet  Commonly known as: KLOR-CON M     Simethicone 125 MG Caps     simvastatin 20 MG tablet  Commonly known as: ZOCOR     traZODone 50 MG tablet  Commonly known as: DESYREL                DISCONTINUED MEDICATIONS:  Discharge Medication List as of 10/28/2023  4:20 PM          I am the Primary Clinician of Record. Ivory Ortega MD (electronically signed)    (Please note that parts of this dictation were completed with voice recognition software. Quite often unanticipated grammatical, syntax, homophones, and other interpretive errors are inadvertently transcribed by the computer software. Please disregards these errors.  Please excuse any errors that have escaped final proofreading.)     Cielo Gonzalez MD  10/29/23 9905

## 2023-10-29 LAB
BACTERIA SPEC CULT: NORMAL
BACTERIA SPEC CULT: NORMAL
Lab: NORMAL
Lab: NORMAL

## 2023-10-30 LAB
EKG ATRIAL RATE: 86 BPM
EKG DIAGNOSIS: NORMAL
EKG P AXIS: 46 DEGREES
EKG P-R INTERVAL: 162 MS
EKG Q-T INTERVAL: 344 MS
EKG QRS DURATION: 86 MS
EKG QTC CALCULATION (BAZETT): 411 MS
EKG R AXIS: 6 DEGREES
EKG T AXIS: -60 DEGREES
EKG VENTRICULAR RATE: 86 BPM

## 2023-11-03 LAB
BACTERIA SPEC CULT: NORMAL
BACTERIA SPEC CULT: NORMAL
Lab: NORMAL
Lab: NORMAL

## (undated) DEVICE — NEEDLE SCLERO 23GA L240CM OD064MM ID032MM CLR INTERJECT

## (undated) DEVICE — THE ENDO CARRY-ON PROCEDURE KIT CONTAINS ALL OF THE SUPPLIES AND INFECTION PREVENTION PRODUCTS NEEDED FOR ENDOSCOPIC PROCEDURES: Brand: ENDO CARRY-ON PROCEDURE KIT

## (undated) DEVICE — FORCEPS BX 240CM JAW 3.2MM L CAP NDL MIC MESH TTH M00513372

## (undated) DEVICE — RESOLUTION 360 CLIP